# Patient Record
Sex: FEMALE | Race: WHITE | Employment: OTHER | URBAN - METROPOLITAN AREA
[De-identification: names, ages, dates, MRNs, and addresses within clinical notes are randomized per-mention and may not be internally consistent; named-entity substitution may affect disease eponyms.]

---

## 2021-02-26 NOTE — LETTER
AUTHORIZATION FOR SURGICAL OPERATION OR OTHER PROCEDURE    1. I hereby authorize Dr. Cano, and [facility] staff assigned to my case to perform the following operation and/or procedure at the [facility] site:    _______________________________________________________________________________________________    Cortisone injection to Right foot  _______________________________________________________________________________________________    2.  My physician has explained the nature and purpose of the operation or other procedure, possible alternative methods of treatment, the risks involved, and the possibility of complication to me.  I acknowledge that no guarantee has been made as to the result that may be obtained.  3.  I recognize that, during the course of this operation, or other procedure, unforseen conditions may necessitate additional or different procedure than those listed above.  I, therefore, further authorize and request that the above named physician, his/her physician assistants or designees perform such procedures as are, in his/her professional opinion, necessary and desirable.  4.  Any tissue or organs removed in the operation or other procedure may be disposed of by and at the discretion of the [facility] and [facility].  5.  I understand that in the event of a medical emergency, I will be transported by local paramedics to [facility] or other hospital emergency department.  6.  I certify that I have read and fully understand the above consent to operation and/or other procedure.    7.  I acknowledge that my physician has explained sedation/analgesia administration to me including the risks and benefits.  I consent to the administration of sedation/analgesia as may be necessary or desirable in the judgement of my physician.    Witness signature: ___________________________________________________ Date:  ______/______/_____                     Time:  ________ A.M.  P.M.       Patient Name:  ______________________________________________________  (please print)      Patient signature:  ___________________________________________________             Relationship to Patient:           []  Parent    Responsible person                          []  Spouse  In case of minor or                    [] Other  _____________   Incompetent name:  __________________________________________________                               (please print)      _____________      Responsible person  In case of minor or  Incompetent signature:  _______________________________________________    Statement of Physician  My signature below affirms that prior to the time of the procedure, I have explained to the patient and/or his/her guardian, the risks and benefits involved in the proposed treatment and any reasonable alternative to the proposed treatment.  I have also explained the risks and benefits involved in the refusal of the proposed treatment and have answered the patient's questions.                        Date:  ______/______/_______  Provider                      Signature:  __________________________________________________________       Time:  ___________ A.M    P.M.      Nasal mucosa clear.  Mouth with normal mucosa  Throat has no vesicles, no oropharyngeal exudates and uvula is midline.

## 2024-01-11 ENCOUNTER — OFFICE VISIT (OUTPATIENT)
Dept: INTERNAL MEDICINE CLINIC | Facility: CLINIC | Age: 74
End: 2024-01-11
Payer: MEDICARE

## 2024-01-11 ENCOUNTER — LAB ENCOUNTER (OUTPATIENT)
Dept: LAB | Age: 74
End: 2024-01-11
Attending: INTERNAL MEDICINE
Payer: MEDICARE

## 2024-01-11 VITALS
HEART RATE: 85 BPM | RESPIRATION RATE: 16 BRPM | SYSTOLIC BLOOD PRESSURE: 120 MMHG | TEMPERATURE: 97 F | BODY MASS INDEX: 30.21 KG/M2 | DIASTOLIC BLOOD PRESSURE: 76 MMHG | WEIGHT: 174.81 LBS | HEIGHT: 63.6 IN | OXYGEN SATURATION: 98 %

## 2024-01-11 DIAGNOSIS — D22.9 ATYPICAL NEVI: ICD-10-CM

## 2024-01-11 DIAGNOSIS — G89.29 CHRONIC PAIN OF RIGHT KNEE: ICD-10-CM

## 2024-01-11 DIAGNOSIS — R79.89 LOW VITAMIN D LEVEL: ICD-10-CM

## 2024-01-11 DIAGNOSIS — Z00.00 ENCOUNTER FOR ANNUAL WELLNESS VISIT (AWV) IN MEDICARE PATIENT: Primary | ICD-10-CM

## 2024-01-11 DIAGNOSIS — Z78.0 POSTMENOPAUSAL: ICD-10-CM

## 2024-01-11 DIAGNOSIS — Z12.31 ENCOUNTER FOR SCREENING MAMMOGRAM FOR MALIGNANT NEOPLASM OF BREAST: ICD-10-CM

## 2024-01-11 DIAGNOSIS — M25.561 CHRONIC PAIN OF RIGHT KNEE: ICD-10-CM

## 2024-01-11 DIAGNOSIS — R68.89 OTHER GENERAL SYMPTOMS AND SIGNS: ICD-10-CM

## 2024-01-11 DIAGNOSIS — M79.671 RIGHT FOOT PAIN: ICD-10-CM

## 2024-01-11 PROBLEM — E78.5 HYPERLIPIDEMIA: Status: ACTIVE | Noted: 2020-02-25

## 2024-01-11 PROBLEM — M85.80 OSTEOPENIA: Status: ACTIVE | Noted: 2020-02-25

## 2024-01-11 PROBLEM — H40.211 ACUTE ANGLE-CLOSURE GLAUCOMA OF RIGHT EYE: Status: ACTIVE | Noted: 2021-04-14

## 2024-01-11 PROBLEM — Z86.69 HISTORY OF GLAUCOMA: Status: ACTIVE | Noted: 2021-04-14

## 2024-01-11 LAB
ALBUMIN SERPL-MCNC: 4.3 G/DL (ref 3.4–5)
ALBUMIN/GLOB SERPL: 1.2 {RATIO} (ref 1–2)
ALP LIVER SERPL-CCNC: 96 U/L
ALT SERPL-CCNC: 54 U/L
ANION GAP SERPL CALC-SCNC: 4 MMOL/L (ref 0–18)
AST SERPL-CCNC: 27 U/L (ref 15–37)
BASOPHILS # BLD AUTO: 0.05 X10(3) UL (ref 0–0.2)
BASOPHILS NFR BLD AUTO: 0.8 %
BILIRUB SERPL-MCNC: 1.4 MG/DL (ref 0.1–2)
BUN BLD-MCNC: 14 MG/DL (ref 9–23)
CALCIUM BLD-MCNC: 9.6 MG/DL (ref 8.5–10.1)
CHLORIDE SERPL-SCNC: 106 MMOL/L (ref 98–112)
CHOLEST SERPL-MCNC: 311 MG/DL (ref ?–200)
CO2 SERPL-SCNC: 27 MMOL/L (ref 21–32)
CREAT BLD-MCNC: 0.83 MG/DL
EGFRCR SERPLBLD CKD-EPI 2021: 75 ML/MIN/1.73M2 (ref 60–?)
EOSINOPHIL # BLD AUTO: 0.16 X10(3) UL (ref 0–0.7)
EOSINOPHIL NFR BLD AUTO: 2.5 %
ERYTHROCYTE [DISTWIDTH] IN BLOOD BY AUTOMATED COUNT: 12.1 %
EST. AVERAGE GLUCOSE BLD GHB EST-MCNC: 117 MG/DL (ref 68–126)
FASTING PATIENT LIPID ANSWER: YES
FASTING STATUS PATIENT QL REPORTED: YES
GLOBULIN PLAS-MCNC: 3.5 G/DL (ref 2.8–4.4)
GLUCOSE BLD-MCNC: 101 MG/DL (ref 70–99)
HBA1C MFR BLD: 5.7 % (ref ?–5.7)
HCT VFR BLD AUTO: 43.6 %
HDLC SERPL-MCNC: 53 MG/DL (ref 40–59)
HGB BLD-MCNC: 14.3 G/DL
IMM GRANULOCYTES # BLD AUTO: 0.04 X10(3) UL (ref 0–1)
IMM GRANULOCYTES NFR BLD: 0.6 %
LDLC SERPL CALC-MCNC: 217 MG/DL (ref ?–100)
LYMPHOCYTES # BLD AUTO: 2.39 X10(3) UL (ref 1–4)
LYMPHOCYTES NFR BLD AUTO: 37.5 %
MCH RBC QN AUTO: 29.4 PG (ref 26–34)
MCHC RBC AUTO-ENTMCNC: 32.8 G/DL (ref 31–37)
MCV RBC AUTO: 89.5 FL
MONOCYTES # BLD AUTO: 0.51 X10(3) UL (ref 0.1–1)
MONOCYTES NFR BLD AUTO: 8 %
NEUTROPHILS # BLD AUTO: 3.22 X10 (3) UL (ref 1.5–7.7)
NEUTROPHILS # BLD AUTO: 3.22 X10(3) UL (ref 1.5–7.7)
NEUTROPHILS NFR BLD AUTO: 50.6 %
NONHDLC SERPL-MCNC: 258 MG/DL (ref ?–130)
OSMOLALITY SERPL CALC.SUM OF ELEC: 285 MOSM/KG (ref 275–295)
PLATELET # BLD AUTO: 309 10(3)UL (ref 150–450)
POTASSIUM SERPL-SCNC: 3.8 MMOL/L (ref 3.5–5.1)
PROT SERPL-MCNC: 7.8 G/DL (ref 6.4–8.2)
RBC # BLD AUTO: 4.87 X10(6)UL
SODIUM SERPL-SCNC: 137 MMOL/L (ref 136–145)
TRIGL SERPL-MCNC: 211 MG/DL (ref 30–149)
TSI SER-ACNC: 1.79 MIU/ML (ref 0.36–3.74)
VIT D+METAB SERPL-MCNC: 54.4 NG/ML (ref 30–100)
VLDLC SERPL CALC-MCNC: 47 MG/DL (ref 0–30)
WBC # BLD AUTO: 6.4 X10(3) UL (ref 4–11)

## 2024-01-11 PROCEDURE — 85025 COMPLETE CBC W/AUTO DIFF WBC: CPT

## 2024-01-11 PROCEDURE — 84443 ASSAY THYROID STIM HORMONE: CPT

## 2024-01-11 PROCEDURE — 80053 COMPREHEN METABOLIC PANEL: CPT

## 2024-01-11 PROCEDURE — 83036 HEMOGLOBIN GLYCOSYLATED A1C: CPT

## 2024-01-11 PROCEDURE — 82306 VITAMIN D 25 HYDROXY: CPT

## 2024-01-11 PROCEDURE — 36415 COLL VENOUS BLD VENIPUNCTURE: CPT

## 2024-01-11 PROCEDURE — 80061 LIPID PANEL: CPT

## 2024-01-11 PROCEDURE — G0439 PPPS, SUBSEQ VISIT: HCPCS | Performed by: INTERNAL MEDICINE

## 2024-01-11 PROCEDURE — 99204 OFFICE O/P NEW MOD 45 MIN: CPT | Performed by: INTERNAL MEDICINE

## 2024-01-11 RX ORDER — ROSUVASTATIN CALCIUM 20 MG/1
20 TABLET, COATED ORAL DAILY
COMMUNITY
End: 2024-01-15

## 2024-01-11 RX ORDER — CLOBETASOL PROPIONATE 0.5 MG/G
OINTMENT TOPICAL
COMMUNITY

## 2024-01-11 RX ORDER — MULTIVIT-MIN/IRON/FOLIC ACID/K 18-600-40
6000 CAPSULE ORAL DAILY
COMMUNITY

## 2024-01-11 NOTE — H&P
Subjective:   Kavita Cee is a 73 year old female who presents for a Medicare Subsequent Annual Wellness visit (Pt already had Initial Annual Wellness) and scheduled follow up of multiple significant but stable problems and the following:  .     Hx of glaucoma- follows with ophthalmology. Permanent vision change in R eye     HLD- on statin     Denies immediate family hx of breast or colon cancer.  Denies breast or nipple changes.   Planning for cologuard.   Recently saw gyne in 11/2023.   Had breast/gyne exam at that time.     Has R knee and R foot pain intermittently.   Has been present for years but noting more recently.     Has new growth on upper lip that has been present for 2 weeks and not resolving.     Remainder of ROS negative   History/Other:   Fall Risk Assessment:   She has been screened for Falls and is low risk.      Cognitive Assessment:   She had a completely normal cognitive assessment - see flowsheet entries       Functional Ability/Status:   Kavita Cee has some abnormal functions as listed below:  She has Hearing problems based on screening of functional status.      Depression Screening (PHQ-2/PHQ-9): PHQ-2 SCORE: 0, done 1/11/2024   Last [place] Suicide Screening on 1/11/2024 was No Risk.         Advanced Directives:   She does have a Living Will but we do NOT have it on file in Epic.    She does have a POA but we do NOT have it on file in [employer].    Patient has Advance Care Planning documents but we do not have a copy in EMR. Discussed Advanced Care Planning with patient and instructed patient to get our office a copy to be scanned into EMR.      Patient Active Problem List   Diagnosis    Hyperlipidemia    Acute angle-closure glaucoma of right eye    Osteopenia     Allergies:  She has No Known Allergies.    Current Medications:  Outpatient Medications Marked as Taking for the 1/11/24 encounter (Office Visit) with Deisy Olivas MD   Medication Sig    Calcium Carbonate-Vit  D-Min (CALCIUM 1200 OR) Take by mouth As Directed.    Multiple Vitamins-Minerals (CENTRUM FRESH/FRUITY 50+ OR) Take by mouth As Directed.    clobetasol 0.05 % External Ointment APPLY TO AFFECTED AREA TWICE DAILY FOR 7 DAYS THEN APPLY ONCE AT BEDTIME FOR 7 DAYS    rosuvastatin 20 MG Oral Tab Take 1 tablet (20 mg total) by mouth daily.    Cholecalciferol (VITAMIN D) 50 MCG (2000 UT) Oral Cap Take 3 capsules (6,000 Units total) by mouth daily.       Medical History:  She  has a past medical history of Anxiety and High cholesterol.  Surgical History:  She  has a past surgical history that includes hx parotidectomy (Right).   Family History:  Her family history includes Alzheimer's disease in her mother; Cancer in her father.  Social History:  She  reports that she has never smoked. She has never used smokeless tobacco. She reports current alcohol use of about 2.0 standard drinks of alcohol per week. She reports that she does not use drugs.    Tobacco:  She has never smoked tobacco.    CAGE Alcohol Screen:   CAGE screening score of 0 on 1/11/2024, showing low risk of alcohol abuse.      Patient Care Team:  Deisy Olivas MD as PCP - General (Internal Medicine)    Review of Systems  As in HPI    Objective:   Physical Exam  PHYSICAL EXAM:   General: no acute distress   Eyes: R pupil dilated (chronic since acute glaucoma episode; follows with ophthalmology); sclera normal; conjunctiva normal   ENT:without erythema or exudate  Neck: trachea midline; no adenopathy; thyroid not enlarged   Hematologic/lymphatic:no cervical lymphadenopathy; no supraclavicular adenopathy   Respiratory: no increased work of breathing; good air exchange; CTAB; no crackles or wheezing   Cardiovascular: RRR; S1, S2; no murmurs; no carotid bruits; no edema   Gastrointestinal: normal bowel sounds; soft; non-distended; non-tender  Neurological: awake, alert, oriented x3; CNII-XII grossly intact  MSK: full ROM; strength 5/5  Behavioral/Psych:  euthymic; appropriate affect   Skin:atypical nevus on upper lip       /76 (BP Location: Left arm, Patient Position: Sitting, Cuff Size: adult)   Pulse 85   Temp 97.3 °F (36.3 °C) (Temporal)   Resp 16   Ht 5' 3.6\" (1.615 m)   Wt 174 lb 12.8 oz (79.3 kg)   SpO2 98%   BMI 30.38 kg/m²  Estimated body mass index is 30.38 kg/m² as calculated from the following:    Height as of this encounter: 5' 3.6\" (1.615 m).    Weight as of this encounter: 174 lb 12.8 oz (79.3 kg).    Medicare Hearing Assessment:   Hearing Screening    Time taken: 1/11/2024 10:44 AM  Entry User: Ken Banda MA  Screening Method: Finger Rub  Finger Rub Result: Pass               Visual Acuity:   Right Eye Visual Acuity: Uncorrected Right Eye Chart Acuity: 20/70   Left Eye Visual Acuity: Uncorrected Left Eye Chart Acuity: 20/70   Both Eyes Visual Acuity: Uncorrected Both Eyes Chart Acuity: 20/70   Able To Tolerate Visual Acuity: Yes        Assessment & Plan:   Kavita Cee is a 73 year old female who presents for a Medicare Assessment.       Kavita was seen today for wellness visit.    Diagnoses and all orders for this visit:    Encounter for annual wellness visit (AWV) in Medicare patient  -     [facility] RHETT 2D+3D SCREENING BILAT (CPT=77067/82146); Future  -     XR DEXA BONE DENSITOMETRY (CPT=77080); Future    Postmenopausal  -     XR DEXA BONE DENSITOMETRY (CPT=77080); Future  -     Vitamin D; Future    Encounter for screening mammogram for malignant neoplasm of breast  -     [facility] RHETT 2D+3D SCREENING BILAT (CPT=77067/59123); Future  -     XR DEXA BONE DENSITOMETRY (CPT=77080); Future  -     Vitamin D; Future  -     CBC With Differential With Platelet; Future  -     Comp Metabolic Panel (14); Future  -     Hemoglobin A1C; Future  -     TSH W Reflex To Free T4; Future  -     Lipid Panel; Future    Low vitamin D level  -     Vitamin D; Future  -     CBC With Differential With Platelet; Future  -     Comp Metabolic Panel (14);  Future  -     Hemoglobin A1C; Future  -     TSH W Reflex To Free T4; Future  -     Lipid Panel; Future    Body mass index (BMI) 30.0-30.9, adult  -     Vitamin D; Future    Other general symptoms and signs  -     TSH W Reflex To Free T4; Future    Atypical nevi  -     Derm Referral - In Network    Chronic pain of right knee  -     Physical Therapy Referral - [facility] Location  -     XR KNEE (1 OR 2 VIEWS), RIGHT (CPT=73560); Future    Right foot pain  -     Physical Therapy Referral - [facility] Location  -     XR FOOT, COMPLETE (MIN 3 VIEWS), RIGHT (CPT=73630); Future          The patient indicates understanding of these issues and agrees to the plan.        No follow-ups on file.     Deisy Perea MD, 1/11/2024     Supplementary Documentation:   General Health:  In the past six months, have you lost more than 10 pounds without trying?: 2 - No  Has your appetite been poor?: No  Type of Diet: Balanced  How does the patient maintain a good energy level?: Appropriate Exercise;Stretching  How would you describe your daily physical activity?: Moderate  How would you describe your current health state?: Good  How do you maintain positive mental well-being?: Social Interaction;Puzzles;Games;Visiting Family  On a scale of 0 to 10, with 0 being no pain and 10 being severe pain, what is your pain level?: 2 - (Mild)  In the past six months, have you experienced urine leakage?: 0-No  At any time do you feel concerned for the safety/well-being of yourself and/or your children, in your home or elsewhere?: No  Have you had any immunizations at another office such as Influenza, Hepatitis B, Tetanus, or Pneumococcal?: Yes       Kavita Cee's SCREENING SCHEDULE   Tests on this list are recommended by your physician but may not be covered, or covered at this frequency, by your insurer.   Please check with your insurance carrier before scheduling to verify coverage.   PREVENTATIVE SERVICES FREQUENCY &  COVERAGE DETAILS LAST  COMPLETION DATE   Diabetes Screening    Fasting Blood Sugar /  Glucose    One screening every 12 months if never tested or if previously tested but not diagnosed with pre-diabetes   One screening every 6 months if diagnosed with pre-diabetes No results found for: \"GLUCOSE\", \"GLU\"     Cardiovascular Disease Screening    Lipid Panel  Cholesterol  Lipoprotein (HDL)  Triglycerides Covered every 5 years for all Medicare beneficiaries without apparent signs or symptoms of cardiovascular disease No results found for: \"CHOLEST\", \"HDL\", \"LDL\", \"LDLD\", \"LDLC\", \"TRIG\"      Electrocardiogram (EKG)   Covered if needed at Welcome to Medicare, and non-screening if indicated for medical reasons -      Ultrasound Screening for Abdominal Aortic Aneurysm (AAA) Covered once in a lifetime for one of the following risk factors    Men who are 65-75 years old and have ever smoked    Anyone with a family history -     Colorectal Cancer Screening  Covered for ages 50-85; only need ONE of the following:    Colonoscopy   Covered every 10 years    Covered every 2 years if patient is at high risk or previous colonoscopy was abnormal -    Health Maintenance   Topic Date Due    Colorectal Cancer Screening  Never done       Flexible Sigmoidoscopy   Covered every 4 years -    Fecal Occult Blood Test Covered annually -   Bone Density Screening    Bone density screening    Covered every 2 years after age 65 if diagnosed with risk of osteoporosis or estrogen deficiency.    Covered yearly for long-term glucocorticoid medication use (Steroids) No results found for this or any previous visit.      Health Maintenance Due   Topic Date Due    DEXA Scan  Never done      Pap and Pelvic    Pap   Covered every 2 years for women at normal risk; Annually if at high risk -  No recommendations at this time    Chlamydia Annually if high risk -  No recommendations at this time   Screening Mammogram    Mammogram     Recommend annually for all female patients aged 40  and older    One baseline mammogram covered for patients aged 35-39 -    Health Maintenance   Topic Date Due    Mammogram  Never done       Immunizations    Influenza Covered once per flu season  Please get every year -  No recommendations at this time    Pneumococcal Each vaccine ([id number] & [id number]) covered once after 65 Prevnar 13: -    [id number]: 09/14/2015     No recommendations at this time    Hepatitis B One screening covered for patients with certain risk factors   -  No recommendations at this time    Tetanus Toxoid Not covered by Medicare Part B unless medically necessary (cut with metal); may be covered with your pharmacy prescription benefits -    Tetanus, Diptheria and Pertusis TD and TDaP Not covered by Medicare Part B -  No recommendations at this time    Zoster Not covered by Medicare Part B; may be covered with your pharmacy  prescription benefits -  No recommendations at this time

## 2024-01-15 DIAGNOSIS — E78.5 HYPERLIPIDEMIA, UNSPECIFIED HYPERLIPIDEMIA TYPE: Primary | ICD-10-CM

## 2024-02-07 ENCOUNTER — HOSPITAL ENCOUNTER (OUTPATIENT)
Dept: GENERAL RADIOLOGY | Age: 74
Discharge: HOME | End: 2024-02-07
Attending: INTERNAL MEDICINE
Payer: MEDICARE

## 2024-02-07 ENCOUNTER — HOSPITAL ENCOUNTER (OUTPATIENT)
Dept: MAMMOGRAPHY | Age: 74
Discharge: HOME | End: 2024-02-07
Attending: INTERNAL MEDICINE
Payer: MEDICARE

## 2024-02-07 ENCOUNTER — HOSPITAL ENCOUNTER (OUTPATIENT)
Dept: BONE DENSITY | Age: 74
Discharge: HOME | End: 2024-02-07
Attending: INTERNAL MEDICINE
Payer: MEDICARE

## 2024-02-07 DIAGNOSIS — M25.561 CHRONIC PAIN OF RIGHT KNEE: ICD-10-CM

## 2024-02-07 DIAGNOSIS — Z00.00 ENCOUNTER FOR ANNUAL WELLNESS VISIT (AWV) IN MEDICARE PATIENT: ICD-10-CM

## 2024-02-07 DIAGNOSIS — M79.671 RIGHT FOOT PAIN: ICD-10-CM

## 2024-02-07 DIAGNOSIS — G89.29 CHRONIC PAIN OF RIGHT KNEE: ICD-10-CM

## 2024-02-07 DIAGNOSIS — Z78.0 POSTMENOPAUSAL: ICD-10-CM

## 2024-02-07 DIAGNOSIS — Z12.31 ENCOUNTER FOR SCREENING MAMMOGRAM FOR MALIGNANT NEOPLASM OF BREAST: ICD-10-CM

## 2024-02-07 PROCEDURE — 73630 X-RAY EXAM OF FOOT: CPT | Performed by: INTERNAL MEDICINE

## 2024-02-07 PROCEDURE — 77067 SCR MAMMO BI INCL CAD: CPT | Performed by: INTERNAL MEDICINE

## 2024-02-07 PROCEDURE — 73560 X-RAY EXAM OF KNEE 1 OR 2: CPT | Performed by: INTERNAL MEDICINE

## 2024-02-07 PROCEDURE — 77063 BREAST TOMOSYNTHESIS BI: CPT | Performed by: INTERNAL MEDICINE

## 2024-02-07 PROCEDURE — 77080 DXA BONE DENSITY AXIAL: CPT | Performed by: INTERNAL MEDICINE

## 2024-02-12 ENCOUNTER — PATIENT MESSAGE (OUTPATIENT)
Dept: INTERNAL MEDICINE CLINIC | Facility: CLINIC | Age: 74
End: 2024-02-12

## 2024-02-12 NOTE — TELEPHONE ENCOUNTER
From: Kavita Cee  To: Deisy Perea  Sent: 2/12/2024 10:50 AM CST  Subject: Test results    Hi Dr Lake  I was wondering if you had a chance to review my test results I had done on the 7th.   After looking at the results on My Chart I have several questions .   Thank you Kavita

## 2024-02-12 NOTE — TELEPHONE ENCOUNTER
Deisy Perea MD  2/7/2024  6:24 PM CST       Results reviewed. Please inform patient that foot xray showing moderate DJD; to try PT; if no improvement then to see podiatry. Knee xray with mild DJD.  DEXA shows that patient has osteopenia. Goal is to consume about a total of 1000-1200mg of calcium daily either through dietary sources or supplements. Also recommend doing light weight bearing exercise as tolerated.  Repeat DEXA in 2 years.  Mammogram results pending outside comparison.

## 2024-03-26 ENCOUNTER — MED REC SCAN ONLY (OUTPATIENT)
Dept: INTERNAL MEDICINE CLINIC | Facility: CLINIC | Age: 74
End: 2024-03-26

## 2024-03-29 ENCOUNTER — LAB ENCOUNTER (OUTPATIENT)
Dept: LAB | Age: 74
End: 2024-03-29
Attending: INTERNAL MEDICINE
Payer: MEDICARE

## 2024-03-29 DIAGNOSIS — E78.5 HYPERLIPIDEMIA, UNSPECIFIED HYPERLIPIDEMIA TYPE: ICD-10-CM

## 2024-03-29 LAB
ALBUMIN SERPL-MCNC: 4.8 G/DL (ref 3.2–4.8)
ALBUMIN/GLOB SERPL: 2 {RATIO} (ref 1–2)
ALP LIVER SERPL-CCNC: 69 U/L
ALT SERPL-CCNC: 27 U/L
ANION GAP SERPL CALC-SCNC: 4 MMOL/L (ref 0–18)
AST SERPL-CCNC: 24 U/L (ref ?–34)
BILIRUB SERPL-MCNC: 1.9 MG/DL (ref 0.2–1.1)
BUN BLD-MCNC: 17 MG/DL (ref 9–23)
BUN/CREAT SERPL: 18.5 (ref 10–20)
CALCIUM BLD-MCNC: 10 MG/DL (ref 8.7–10.4)
CHLORIDE SERPL-SCNC: 107 MMOL/L (ref 98–112)
CHOLEST SERPL-MCNC: 187 MG/DL (ref ?–200)
CO2 SERPL-SCNC: 28 MMOL/L (ref 21–32)
CREAT BLD-MCNC: 0.92 MG/DL
EGFRCR SERPLBLD CKD-EPI 2021: 66 ML/MIN/1.73M2 (ref 60–?)
FASTING PATIENT LIPID ANSWER: YES
FASTING STATUS PATIENT QL REPORTED: YES
GLOBULIN PLAS-MCNC: 2.4 G/DL (ref 2.8–4.4)
GLUCOSE BLD-MCNC: 103 MG/DL (ref 70–99)
HDLC SERPL-MCNC: 48 MG/DL (ref 40–59)
LDLC SERPL CALC-MCNC: 109 MG/DL (ref ?–100)
NONHDLC SERPL-MCNC: 139 MG/DL (ref ?–130)
OSMOLALITY SERPL CALC.SUM OF ELEC: 290 MOSM/KG (ref 275–295)
POTASSIUM SERPL-SCNC: 4.4 MMOL/L (ref 3.5–5.1)
PROT SERPL-MCNC: 7.2 G/DL (ref 5.7–8.2)
SODIUM SERPL-SCNC: 139 MMOL/L (ref 136–145)
TRIGL SERPL-MCNC: 169 MG/DL (ref 30–149)
VLDLC SERPL CALC-MCNC: 29 MG/DL (ref 0–30)

## 2024-03-29 PROCEDURE — 80061 LIPID PANEL: CPT

## 2024-03-29 PROCEDURE — 80053 COMPREHEN METABOLIC PANEL: CPT

## 2024-03-29 PROCEDURE — 36415 COLL VENOUS BLD VENIPUNCTURE: CPT

## 2024-04-26 ENCOUNTER — LAB ENCOUNTER (OUTPATIENT)
Dept: LAB | Age: 74
End: 2024-04-26
Attending: INTERNAL MEDICINE
Payer: MEDICARE

## 2024-04-26 DIAGNOSIS — R17 ELEVATED BILIRUBIN: ICD-10-CM

## 2024-04-26 LAB
ALBUMIN SERPL-MCNC: 4.8 G/DL (ref 3.2–4.8)
ALBUMIN/GLOB SERPL: 2.2 {RATIO} (ref 1–2)
ALP LIVER SERPL-CCNC: 73 U/L
ALT SERPL-CCNC: 39 U/L
ANION GAP SERPL CALC-SCNC: 7 MMOL/L (ref 0–18)
AST SERPL-CCNC: 40 U/L (ref ?–34)
BILIRUB DIRECT SERPL-MCNC: 0.6 MG/DL (ref ?–0.3)
BILIRUB SERPL-MCNC: 1.9 MG/DL (ref 0.2–1.1)
BUN BLD-MCNC: 15 MG/DL (ref 9–23)
BUN/CREAT SERPL: 17.2 (ref 10–20)
CALCIUM BLD-MCNC: 9.6 MG/DL (ref 8.7–10.4)
CHLORIDE SERPL-SCNC: 106 MMOL/L (ref 98–112)
CO2 SERPL-SCNC: 28 MMOL/L (ref 21–32)
CREAT BLD-MCNC: 0.87 MG/DL
EGFRCR SERPLBLD CKD-EPI 2021: 71 ML/MIN/1.73M2 (ref 60–?)
FASTING STATUS PATIENT QL REPORTED: YES
GLOBULIN PLAS-MCNC: 2.2 G/DL (ref 2.8–4.4)
GLUCOSE BLD-MCNC: 108 MG/DL (ref 70–99)
OSMOLALITY SERPL CALC.SUM OF ELEC: 293 MOSM/KG (ref 275–295)
POTASSIUM SERPL-SCNC: 3.9 MMOL/L (ref 3.5–5.1)
PROT SERPL-MCNC: 7 G/DL (ref 5.7–8.2)
SODIUM SERPL-SCNC: 141 MMOL/L (ref 136–145)

## 2024-04-26 PROCEDURE — 82248 BILIRUBIN DIRECT: CPT

## 2024-04-26 PROCEDURE — 80053 COMPREHEN METABOLIC PANEL: CPT

## 2024-04-26 PROCEDURE — 36415 COLL VENOUS BLD VENIPUNCTURE: CPT

## 2024-04-27 DIAGNOSIS — R79.89 ELEVATED LFTS: Primary | ICD-10-CM

## 2024-04-28 ENCOUNTER — PATIENT MESSAGE (OUTPATIENT)
Dept: INTERNAL MEDICINE CLINIC | Facility: CLINIC | Age: 74
End: 2024-04-28

## 2024-04-29 NOTE — TELEPHONE ENCOUNTER
Patient called back after reviewing MCM asking what could be causing the elevation in numbers. It was explained that it's hard to say without seeing what's going on with the liver. Explained it could be alcohol use, weight, vs other things. Patient v/u.

## 2024-04-29 NOTE — TELEPHONE ENCOUNTER
From: Kavita Cee  To: Deisy Perea  Sent: 4/28/2024 9:07 AM CDT  Subject: Test results    Good morning,   I see that the bilirubin is still elevated and was wondering what could be causing this?   Thanks Kavita

## 2024-04-29 NOTE — TELEPHONE ENCOUNTER
LMTCB x 1         Deisy Perea MD  4/27/2024 11:00 AM CDT Back to Top      Results reviewed. Please inform patient that she still has elevated bilirubin and now slightly elevated AST. Should complete abdominal US. And repeat cmp in 2 weeks. Orders placed. If acute abdominal pain then ER.

## 2024-05-03 ENCOUNTER — OFFICE VISIT (OUTPATIENT)
Dept: PODIATRY CLINIC | Facility: CLINIC | Age: 74
End: 2024-05-03
Payer: MEDICARE

## 2024-05-03 VITALS — DIASTOLIC BLOOD PRESSURE: 78 MMHG | SYSTOLIC BLOOD PRESSURE: 128 MMHG

## 2024-05-03 DIAGNOSIS — M19.071 ARTHRITIS OF RIGHT MIDFOOT: Primary | ICD-10-CM

## 2024-05-03 DIAGNOSIS — M79.671 RIGHT FOOT PAIN: ICD-10-CM

## 2024-05-03 PROCEDURE — 20600 DRAIN/INJ JOINT/BURSA W/O US: CPT | Performed by: STUDENT IN AN ORGANIZED HEALTH CARE EDUCATION/TRAINING PROGRAM

## 2024-05-03 PROCEDURE — 99203 OFFICE O/P NEW LOW 30 MIN: CPT | Performed by: STUDENT IN AN ORGANIZED HEALTH CARE EDUCATION/TRAINING PROGRAM

## 2024-05-03 RX ORDER — DEXAMETHASONE SODIUM PHOSPHATE 4 MG/ML
2 VIAL (ML) INJECTION ONCE
Status: COMPLETED | OUTPATIENT
Start: 2024-05-03 | End: 2024-05-03

## 2024-05-03 NOTE — PROGRESS NOTES
Per Dr. Cano to draw up .5ml of dexamethasone sodium phosphate. .5ml Kenalog. 10 and 1ml marcaine 0.5% for a right foot injection.

## 2024-05-05 NOTE — PROGRESS NOTES
[facility]  Progress Note    Kavita Cee is a 73 year old female.   Chief Complaint   Patient presents with    Foot Pain     Right foot- rates pain 10/10 when walking-          HPI:     Patient is a very pleasant 73-year-old female who presents to clinic for evaluation of pain to her right midfoot region.  She has pain that can reach a 10 out of 10 when walking.  She has completed x-rays in the past which shows some arthritic changes to her midfoot.  She has completed physical therapy which temporarily helped her symptoms but they returned afterwards.  Patient is very active.  She denies any recent trauma or injury.  She presents today for evaluation.  No other complaints are mentioned.  Past medical history, medications, and allergies reviewed.      Allergies: Patient has no known allergies.   Current Outpatient Medications   Medication Sig Dispense Refill    rosuvastatin 20 MG Oral Tab Take 1 tablet (20 mg total) by mouth daily. 90 tablet 3    Calcium Carbonate-Vit D-Min (CALCIUM 1200 OR) Take by mouth As Directed.      Multiple Vitamins-Minerals (CENTRUM FRESH/FRUITY 50+ OR) Take by mouth As Directed.      clobetasol 0.05 % External Ointment APPLY TO AFFECTED AREA TWICE DAILY FOR 7 DAYS THEN APPLY ONCE AT BEDTIME FOR 7 DAYS      Cholecalciferol (VITAMIN D) 50 MCG (2000 UT) Oral Cap Take 3 capsules (6,000 Units total) by mouth daily.        Past Medical History:    Anxiety    High cholesterol      Past Surgical History:   Procedure Laterality Date    Hx parotidectomy Right       Family History   Problem Relation Age of Onset    Cancer Father     Other (Alzheimer's disease) Mother       Social History     Socioeconomic History    Marital status:    Tobacco Use    Smoking status: Never    Smokeless tobacco: Never   Vaping Use    Vaping status: Never Used   Substance and Sexual Activity    Alcohol use: Yes     Alcohol/week: 2.0 standard drinks of alcohol     Types: 2 Standard drinks or  equivalent per week     Comment: Occ    Drug use: No   Other Topics Concern    Caffeine Concern No    Exercise No    Seat Belt No    Special Diet No    Stress Concern No    Weight Concern No           REVIEW OF SYSTEMS:     Denies nausea, vomiting, fever, chills.      EXAM:   /78 (BP Location: Right arm, Patient Position: Sitting, Cuff Size: adult)   GENERAL: well developed, well nourished, in no apparent distress  EXTREMITIES:   1. Integument: Normal skin temperature and turgor  2. Vascular: Dorsalis pedis two out of four bilateral and posterior tibial pulses two out of   four bilateral, capillary refill normal.   3. Musculoskeletal: All muscle groups are graded 5 out of 5 in the foot and ankle.  Exostosis formation noted to the dorsal right midfoot.  Pain noted exostosis site.  Compartments are soft and compressible.   4. Neurological: Normal sharp dull sensation; reflexes normal.          ASSESSMENT AND PLAN:   Diagnoses and all orders for this visit:    Arthritis of right midfoot  -     dexamethasone (Decadron) 4 MG/ML injection 2 mg  -     triamcinolone acetonide (Kenalog-10) 10 mg/mL injection    Other orders  -     [facility] AMB POD XR - RT FOOT 3 VIEWS(AP,LAT,OBLIQUE)WT BEARING        Plan:    -Patient examined, chart history reviewed.  -Discussed etiology of condition and various treatment options.  -Obtain reviewed x-ray findings with patient--fairly advanced arthritic changes to midtarsal joints, especially first through third tarsometatarsal joints with exostosis formation.  This does correlate with area of patient's pain.  -Discussed importance of supportive shoes and inserts.  Informational handout dispensed with recommended shoes.  Recommend Hoka tennis shoes.  -Patient to ambulate supportive shoes and avoid walking barefoot.  -Discussed cortisone injection to patient including benefits and risks.  Patient agreeable and written consent was obtained.  After prepping site with alcohol, administered  cortisone injection to right midfoot tarsometatarsal joints consisting of 1 cc 0.5% Marcaine plain, 0.5 cc of Kenalog 10, 0.5 cc of dexamethasone.  Patient tolerated injection well and there were no complications.  Site was dressed with Band-Aid.  -If symptoms worsen or fail to improve could also consider midfoot fusion procedures.  Patient like to avoid if possible, understandably.    Can follow-up in 1 month for reevaluation or sooner if other concerns arise.    The patient indicates understanding of these issues and agrees to the plan.        [employer] speech recognition software was used to prepare this note.  Errors in word recognition may occur.  Please contact me with any questions/concerns with this note.

## 2024-05-10 ENCOUNTER — PATIENT MESSAGE (OUTPATIENT)
Dept: PODIATRY CLINIC | Facility: CLINIC | Age: 74
End: 2024-05-10

## 2024-05-10 NOTE — TELEPHONE ENCOUNTER
From: Kavita Cee  To: Kash Cano  Sent: 5/10/2024 7:58 AM CDT  Subject: Foot issue    Good morning Dr Cano,  Thank you for helping me with my foot pain, my foot feels so much better!   Sorry to bother you with what seems like a silly question but I went to the [employer] store for a pair of Hoka’s as you suggested and walked out of the store so happy without any foot issues. After wearing the shoes for a few hours the pain on top of my foot was gone but I was having other issues with the shoes like my toes felt cramped in them. I’m just not sure if it’s the fit of the shoe or the wrong shoe for my foot. Before I go back with them, would you suggest staying with this brand of shoes just in a different size or another brand of shoe that was on your list?   Thank you   Kavita

## 2024-05-30 ENCOUNTER — TELEPHONE (OUTPATIENT)
Dept: INTERNAL MEDICINE CLINIC | Facility: CLINIC | Age: 74
End: 2024-05-30

## 2024-05-30 NOTE — TELEPHONE ENCOUNTER
Incoming Fax  [facility] placed in DV's inbasket for review and signature.

## 2024-06-11 ENCOUNTER — OFFICE VISIT (OUTPATIENT)
Dept: PODIATRY CLINIC | Facility: CLINIC | Age: 74
End: 2024-06-11

## 2024-06-11 DIAGNOSIS — M79.671 RIGHT FOOT PAIN: ICD-10-CM

## 2024-06-11 DIAGNOSIS — M19.071 ARTHRITIS OF RIGHT MIDFOOT: Primary | ICD-10-CM

## 2024-06-11 PROCEDURE — 99213 OFFICE O/P EST LOW 20 MIN: CPT | Performed by: STUDENT IN AN ORGANIZED HEALTH CARE EDUCATION/TRAINING PROGRAM

## 2024-06-11 NOTE — PROGRESS NOTES
[facility] Podiatry  Progress Note    Kavita Cee is a 73 year old female.   Chief Complaint   Patient presents with    Foot Pain     R foot f/u- had injection on 5/03/2024 w/ some improvement- rates pain 2-3/10 depends w/ activity         HPI:     Patient is a very pleasant 73-year-old female who presents to clinic for evaluation of pain to her right midfoot region.  She received cortisone injection at site of last visit which did not really help her symptoms.  She has no pain at rest but does have some pain with activities that can reach a 2 or 3 out of 10.  This is better than the 10 on 10 pain she experienced last visit.  She has also acquired new Peters tennis shoes relates that these are very comfortable and working well for her.  She presents today for reevaluation.  No other complaints are mentioned.    Allergies: Patient has no known allergies.   Current Outpatient Medications   Medication Sig Dispense Refill    rosuvastatin 20 MG Oral Tab Take 1 tablet (20 mg total) by mouth daily. 90 tablet 3    Calcium Carbonate-Vit D-Min (CALCIUM 1200 OR) Take by mouth As Directed.      Multiple Vitamins-Minerals (CENTRUM FRESH/FRUITY 50+ OR) Take by mouth As Directed.      clobetasol 0.05 % External Ointment APPLY TO AFFECTED AREA TWICE DAILY FOR 7 DAYS THEN APPLY ONCE AT BEDTIME FOR 7 DAYS      Cholecalciferol (VITAMIN D) 50 MCG (2000 UT) Oral Cap Take 3 capsules (6,000 Units total) by mouth daily.        Past Medical History:    Anxiety    High cholesterol      Past Surgical History:   Procedure Laterality Date    Hx parotidectomy Right       Family History   Problem Relation Age of Onset    Cancer Father     Other (Alzheimer's disease) Mother       Social History     Socioeconomic History    Marital status:    Tobacco Use    Smoking status: Never    Smokeless tobacco: Never   Vaping Use    Vaping status: Never Used   Substance and Sexual Activity    Alcohol use: Yes     Alcohol/week: 2.0 standard  drinks of alcohol     Types: 2 Standard drinks or equivalent per week     Comment: Occ    Drug use: No   Other Topics Concern    Caffeine Concern No    Exercise No    Seat Belt No    Special Diet No    Stress Concern No    Weight Concern No           REVIEW OF SYSTEMS:     Denies nausea, vomiting, fever, chills.      EXAM:   There were no vitals taken for this visit.  GENERAL: well developed, well nourished, in no apparent distress  EXTREMITIES:   1. Integument: Normal skin temperature and turgor  2. Vascular: Dorsalis pedis two out of four bilateral and posterior tibial pulses two out of   four bilateral, capillary refill normal.   3. Musculoskeletal: All muscle groups are graded 5 out of 5 in the foot and ankle.  Exostosis formation noted to the dorsal right midfoot.  Today there is no major pain noted exostosis site.  Compartments are soft and compressible.   4. Neurological: Normal sharp dull sensation; reflexes normal.          ASSESSMENT AND PLAN:   Diagnoses and all orders for this visit:    Arthritis of right midfoot    Right foot pain          Plan:    -Patient examined, chart history reviewed.  -Discussed etiology of condition and various treatment options.  -Reviewed prior x-ray findings--fairly advanced arthritic changes to midtarsal joints, especially first through third tarsometatarsal joints with exostosis formation.  This does correlate with area of patient's pain.  -Patient's pain is much better after last cortisone shot.  Discussed with patient that she can receive up to 3 days/year.  -Patient should continue better with supportive Peters tennis shoes.  Should avoid walking barefoot, even in her house.  -If symptoms worsen or fail to improve could also consider midfoot fusion procedures.  Patient like to avoid if possible, understandably.    Can RTC 2 months for possible cortisone shot/as needed.    The patient indicates understanding of these issues and agrees to the plan.        Kash Cano,  DPM    Dragon speech recognition software was used to prepare this note.  Errors in word recognition may occur.  Please contact me with any questions/concerns with this note.

## 2024-07-10 ENCOUNTER — HOSPITAL ENCOUNTER (OUTPATIENT)
Dept: ULTRASOUND IMAGING | Age: 74
Discharge: HOME | End: 2024-07-10
Attending: INTERNAL MEDICINE
Payer: MEDICARE

## 2024-07-10 DIAGNOSIS — R79.89 ELEVATED LFTS: ICD-10-CM

## 2024-07-10 PROCEDURE — 76700 US EXAM ABDOM COMPLETE: CPT | Performed by: INTERNAL MEDICINE

## 2024-09-04 ENCOUNTER — HOSPITAL ENCOUNTER (OUTPATIENT)
Dept: MRI IMAGING | Facility: HOSPITAL | Age: 74
Discharge: HOME | End: 2024-09-04
Attending: INTERNAL MEDICINE
Payer: MEDICARE

## 2024-09-04 DIAGNOSIS — R16.0 LIVER MASS: ICD-10-CM

## 2024-09-04 DIAGNOSIS — K86.2 PANCREAS CYST (HCC): ICD-10-CM

## 2024-09-04 PROCEDURE — 74183 MRI ABD W/O CNTR FLWD CNTR: CPT | Performed by: INTERNAL MEDICINE

## 2024-09-04 PROCEDURE — A9575 INJ GADOTERATE MEGLUMI 0.1ML: HCPCS | Performed by: INTERNAL MEDICINE

## 2024-09-04 RX ORDER — GADOTERATE MEGLUMINE 376.9 MG/ML
20 INJECTION INTRAVENOUS
Status: COMPLETED | OUTPATIENT
Start: 2024-09-04 | End: 2024-09-04

## 2024-09-04 RX ADMIN — GADOTERATE MEGLUMINE 16 ML: 376.9 INJECTION INTRAVENOUS at 07:54:00

## 2024-09-11 ENCOUNTER — OFFICE VISIT (OUTPATIENT)
Dept: PODIATRY CLINIC | Facility: CLINIC | Age: 74
End: 2024-09-11

## 2024-09-11 VITALS — DIASTOLIC BLOOD PRESSURE: 90 MMHG | SYSTOLIC BLOOD PRESSURE: 160 MMHG

## 2024-09-11 DIAGNOSIS — M19.071 ARTHRITIS OF RIGHT MIDFOOT: Primary | ICD-10-CM

## 2024-09-11 DIAGNOSIS — M79.671 RIGHT FOOT PAIN: ICD-10-CM

## 2024-09-11 PROCEDURE — 99213 OFFICE O/P EST LOW 20 MIN: CPT | Performed by: STUDENT IN AN ORGANIZED HEALTH CARE EDUCATION/TRAINING PROGRAM

## 2024-09-11 RX ORDER — DEXAMETHASONE SODIUM PHOSPHATE 4 MG/ML
2 VIAL (ML) INJECTION ONCE
Status: COMPLETED | OUTPATIENT
Start: 2024-09-11 | End: 2024-09-11

## 2024-09-11 RX ADMIN — DEXAMETHASONE SODIUM PHOSPHATE 2 MG: 4 MG/ML VIAL (ML) INJECTION at 10:02:00

## 2024-09-11 NOTE — PROGRESS NOTES
Per Dr. Cano to draw up .5ml of dexamethasone sodium phosphate. .5ml Kenalog 10 and 1ml marcaine 0.5% for a right foot injection .

## 2024-09-11 NOTE — PROGRESS NOTES
[facility] Podiatry  Progress Note    Kavita Cee is a 73 year old female.   Chief Complaint   Patient presents with    Foot Pain     R foot f/u- pt requesting cortisone injection today- rates pain 3-4/10 on and off         HPI:     Patient is a very pleasant 73-year-old female who presents to clinic for evaluation of pain to her right midfoot region.  She is doing better with cortisone injections and supportive Peters tennis shoes.  She has pain that she rates at a 3-4 out of 10 on and off and is hoping for another cortisone injection at this time.  It has been 4 months since her last injection.  No other complaints are mentioned.      Allergies: Patient has no known allergies.   Current Outpatient Medications   Medication Sig Dispense Refill    rosuvastatin 20 MG Oral Tab Take 1 tablet (20 mg total) by mouth daily. 90 tablet 3    Calcium Carbonate-Vit D-Min (CALCIUM 1200 OR) Take by mouth As Directed.      Multiple Vitamins-Minerals (CENTRUM FRESH/FRUITY 50+ OR) Take by mouth As Directed.      clobetasol 0.05 % External Ointment APPLY TO AFFECTED AREA TWICE DAILY FOR 7 DAYS THEN APPLY ONCE AT BEDTIME FOR 7 DAYS      Cholecalciferol (VITAMIN D) 50 MCG (2000 UT) Oral Cap Take 3 capsules (6,000 Units total) by mouth daily.        Past Medical History:    Anxiety    High cholesterol      Past Surgical History:   Procedure Laterality Date    Hx parotidectomy Right       Family History   Problem Relation Age of Onset    Cancer Father     Other (Alzheimer's disease) Mother       Social History     Socioeconomic History    Marital status:    Tobacco Use    Smoking status: Never    Smokeless tobacco: Never   Vaping Use    Vaping status: Never Used   Substance and Sexual Activity    Alcohol use: Yes     Alcohol/week: 2.0 standard drinks of alcohol     Types: 2 Standard drinks or equivalent per week     Comment: Occ    Drug use: No   Other Topics Concern    Caffeine Concern No    Exercise No    Seat Belt  No    Special Diet No    Stress Concern No    Weight Concern No           REVIEW OF SYSTEMS:     Denies nausea, vomiting, fever, chills.      EXAM:   There were no vitals taken for this visit.  GENERAL: well developed, well nourished, in no apparent distress  EXTREMITIES:   1. Integument: Normal skin temperature and turgor  2. Vascular: Dorsalis pedis two out of four bilateral and posterior tibial pulses two out of   four bilateral, capillary refill normal.   3. Musculoskeletal: All muscle groups are graded 5 out of 5 in the foot and ankle.  Exostosis formation noted to the dorsal right midfoot.  Pain noted exostosis site.  Compartments are soft and compressible.   4. Neurological: Normal sharp dull sensation; reflexes normal.          ASSESSMENT AND PLAN:   Diagnoses and all orders for this visit:    Arthritis of right midfoot  -     dexamethasone (Decadron) 4 MG/ML injection 2 mg  -     triamcinolone acetonide (Kenalog-10) 10 mg/mL injection    Right foot pain        Plan:    -Patient examined, chart history reviewed.  -Discussed etiology of condition and various treatment options.  -Reviewed prior x-ray findings--fairly advanced arthritic changes to midtarsal joints, especially first through third tarsometatarsal joints with exostosis formation.  This does correlate with area of patient's pain.  -Continue supportive Peters tennis shoes  -Patient to ambulate supportive shoes and avoid walking barefoot.  -Discussed cortisone injection to patient including benefits and risks.  Patient agreeable and written consent was obtained.  After prepping site with alcohol, administered cortisone injection to right midfoot tarsometatarsal joints consisting of 1 cc 0.5% Marcaine plain, 0.5 cc of Kenalog 10, 0.5 cc of dexamethasone.  Patient tolerated injection well and there were no complications.  Site was dressed with Band-Aid.  -If symptoms worsen or fail to improve could also consider midfoot fusion procedures.  Patient like  to avoid if possible, understandably.    RTC 3 to 4 months or sooner if other concerns arise.    The patient indicates understanding of these issues and agrees to the plan.        Kash Cano DPM    Dragon speech recognition software was used to prepare this note.  Errors in word recognition may occur.  Please contact me with any questions/concerns with this note.

## 2024-09-19 ENCOUNTER — TELEPHONE (OUTPATIENT)
Dept: INTERNAL MEDICINE CLINIC | Facility: CLINIC | Age: 74
End: 2024-09-19

## 2024-09-19 NOTE — TELEPHONE ENCOUNTER
It's a new clinic to assess findings like her's and make sure we don't miss something that could potentially become more problematic. It's closer monitoring.   Would recommend she see Dr. Tran's group.

## 2024-09-19 NOTE — TELEPHONE ENCOUNTER
----- Message from Deisy Perea sent at 9/19/2024 12:32 PM CDT -----  Regarding: RE: [facility] Pancreas [facility]  Hello,    That's good to know. I had referred her to GI, but I will place a referral for your office.     RN pool: please let pt know about new referral.     Thanks,   Deisy  ----- Message -----  From: Yan Tran MD  Sent: 9/19/2024   9:53 AM CDT  To: Deisy Perea MD  Subject: High Risk Pancreas Clinic                        Dr. Lake,  We in Surgical Oncology have recently established a [facility] and are capturing patient such as Ms. Cee with pancreas cysts.  We will be happy to consult with her and follow-up on any needed studies.    Thanks in advance,  Yan

## 2024-10-18 PROBLEM — J01.90 ACUTE BACTERIAL SINUSITIS: Status: ACTIVE | Noted: 2024-10-18

## 2024-10-18 PROBLEM — H26.493 OTHER SECONDARY CATARACT, BILATERAL: Status: ACTIVE | Noted: 2024-04-22

## 2024-10-18 PROBLEM — L65.9 ALOPECIA: Status: ACTIVE | Noted: 2024-10-18

## 2024-10-18 PROBLEM — R05.9 COUGH: Status: ACTIVE | Noted: 2024-10-18

## 2024-10-18 PROBLEM — B96.89 ACUTE BACTERIAL SINUSITIS: Status: ACTIVE | Noted: 2024-10-18

## 2024-10-18 PROBLEM — R03.0 ELEVATED BLOOD-PRESSURE READING WITHOUT DIAGNOSIS OF HYPERTENSION: Status: ACTIVE | Noted: 2024-10-18

## 2024-10-21 ENCOUNTER — OFFICE VISIT (OUTPATIENT)
Dept: SURGERY | Facility: CLINIC | Age: 74
End: 2024-10-21
Payer: MEDICARE

## 2024-10-21 VITALS
SYSTOLIC BLOOD PRESSURE: 159 MMHG | HEART RATE: 90 BPM | DIASTOLIC BLOOD PRESSURE: 78 MMHG | BODY MASS INDEX: 30 KG/M2 | WEIGHT: 171 LBS | OXYGEN SATURATION: 99 %

## 2024-10-21 DIAGNOSIS — K86.2 PANCREATIC CYST (HCC): Primary | ICD-10-CM

## 2024-10-21 PROCEDURE — 99205 OFFICE O/P NEW HI 60 MIN: CPT | Performed by: SURGERY

## 2024-10-21 NOTE — CONSULTS
[facility]      Patient Name:  Kavita Cee   YOB: 1950   Gender:  Female   Appt Date:  10/21/2024   Provider:  Yan Tran MD     PATIENT PROVIDERS  Referring Provider: Deisy Perea MD     Primary Care Provider:Deisy Perea MD   Address: [address] [place] [address]   Phone #: [phone]       CHIEF COMPLAINT  Chief Complaint   Patient presents with    Consult        PROBLEMS  Reviewed   Patient Active Problem List   Diagnosis    History of glaucoma    Osteopenia    Pneumonia    Other secondary cataract, bilateral    Familial combined hyperlipidemia    Disease of liver    Carpal tunnel syndrome    Alopecia    Pancreatic cyst (HCC)        History of Present Illness:  Patient is a 73 year old woman who is currently being referred for consideration of surgical oncology management of recently diagnosed pancreatic cyst.     3/29/2024: Total Bilirubin elevated 1.9   4/26/2024: Total Bilirubin elevated 1.9, AST elevated 40, Direct Bilirubin 0.6    7/10/2024: US Abdomen ordered for elevated liver enzymes showing mass near gallbladder fossa 3 x 2.6 x 2.3 cm hyperechoic with calcification possibly representing atypical hemangioma and an additional hyperechoic nodule posterior right hepatic lobe measuring 1 cm, as well as cystic lesion of the head of the pancreas measuring 1.4 cm. CBD measuring 5 mm.     9/4/2024: MRI Abdomen showing 3 complex cysts without enhancement in the liver, largest within the posterior segment of the right lobe medically measuring 2.3 x 2.4 cm. Cystic focus along the pancreatic head measuring 0.6 x 1.2 cm and an additional exophytic cystic focus along the pancreatic tail projecting cephalad toward the stomach measuring 1.3 x 1.1 cm. Main PD normal caliber. Cystic foci appear to communicate with the duct.        Vital Signs:  /78   Pulse 90   Wt 77.6 kg (171 lb)   SpO2 99%   BMI 29.72 kg/m²      Medications  Reviewed:    Current Outpatient Medications:     rosuvastatin 20 MG Oral Tab, Take 1 tablet (20 mg total) by mouth daily., Disp: 90 tablet, Rfl: 3    Calcium Carbonate-Vit D-Min (CALCIUM 1200 OR), Take by mouth As Directed., Disp: , Rfl:     Multiple Vitamins-Minerals (CENTRUM FRESH/FRUITY 50+ OR), Take by mouth As Directed., Disp: , Rfl:     clobetasol 0.05 % External Ointment, APPLY TO AFFECTED AREA TWICE DAILY FOR 7 DAYS THEN APPLY ONCE AT BEDTIME FOR 7 DAYS, Disp: , Rfl:     Cholecalciferol (VITAMIN D) 50 MCG (2000 UT) Oral Cap, Take 3 capsules (6,000 Units total) by mouth daily., Disp: , Rfl:      Allergies Reviewed:  Allergies[1]     History:  Reviewed:  Past Medical History:    Anxiety    High cholesterol      Reviewed:  Past Surgical History:   Procedure Laterality Date    Hx parotidectomy Right       Reviewed Social History:  Social History     Socioeconomic History    Marital status:    Tobacco Use    Smoking status: Never    Smokeless tobacco: Never   Vaping Use    Vaping status: Never Used   Substance and Sexual Activity    Alcohol use: Yes     Alcohol/week: 2.0 standard drinks of alcohol     Types: 2 Standard drinks or equivalent per week     Comment: Occ    Drug use: No   Other Topics Concern    Caffeine Concern No    Exercise No    Seat Belt No    Special Diet No    Stress Concern No    Weight Concern No     Social Drivers of Health      Received from [facility]      Reviewed:  Family History   Problem Relation Age of Onset    Cancer Father     Other (Alzheimer's disease) Mother       Review of Systems:  GENERAL HEALTH: feels well, no fatigue.   RESPIRATORY: denies shortness of breath  CARDIOVASCULAR: denies chest pain, SOB, edema,orthopnea, no palpitations   GI: denies nausea, vomiting, constipation, diarrhea; no rectal bleeding  GENITAL/: no blood in urine  MUSCULOSKELETAL: no joint complaints, no back pain  NEURO: no tingling, numbness, weakness  ENDOCRINE:  denies weight loss/gain  PSYCH: no mood changes       Physical Examination:  Constitutional: NAD.   Eyes: Sclera: non-icteric.   Lymph Nodes: Lymph Nodes no cervical LAD, supraclavicular LAD, axillary LAD, or inguinal LAD.   Lungs: Auscultation: breath sounds normal.   Cardiovascular: Heart Auscultation: RRR.   Abdomen: Inspection and Palpation: no masses, tenderness (no guarding, no rebound), or CVA tenderness and soft and non-distended. Liver: no hepatomegaly. Spleen: no splenomegaly. Hernia: none palpable.   Musculoskeletal: Extremities: no edema.   Skin: Inspection and palpation: no jaundice.      Document Review:  US Abdomen:  FINDINGS:    LIVER:  Mass near gallbladder fossa measures 3 x 2.6 x 2.3 cm.  This is mostly hyperechoic in may be associated with calcifications.  This could potentially represent an atypical hemangioma.  An additional hyperechoic nodule posterior right hepatic lobe   is noted measuring up to 1 cm in diameter.  Additional evaluation with contrast-enhanced MRI is recommended.   BILIARY:  There is a shadowing stone within gallbladder lumen.  There is no evidence of cholecystitis.  Common bile duct measures 5 mm.   PANCREAS:  Cystic lesion in the head of the pancreas measures up to 1.4 cm.   SPLEEN:  Normal.   KIDNEYS:  Normal.  Right kidney measures 10.7 cm.  Left kidney measures 10.7 cm.   AORTA/IVC:  Normal.     MRI Abdomen  Nonenhancing small cystic foci within the pancreas which appear to communicate with branch ducts of the main pancreatic duct.  The possibility of IPMN is not excluded.      Procedure(s):  None     Assessment / Plan:  Pancreatic cystic lesions  Findings, differential diagnosis, options, and recommendations were discussed with patient at length.  Pancreatic cystic lesions discussed as an entity.  At this time, clinical suspicion is low.  I recommended consultation with GI for consideration of EUS.  Rationale discussed.  Patient agreed.  Appropriate referrals will be  made.  Thus, final recommendations pending.  Patient had ample time to ask questions.             Electronically Signed by: Yan Tran MD           [1]   Allergies  Allergen Reactions    Other UNKNOWN

## 2024-11-16 PROBLEM — R79.89 ABNORMAL LFTS: Status: ACTIVE | Noted: 2024-11-16

## 2024-11-20 ENCOUNTER — LAB ENCOUNTER (OUTPATIENT)
Dept: LAB | Age: 74
End: 2024-11-20
Attending: INTERNAL MEDICINE
Payer: MEDICARE

## 2024-11-20 DIAGNOSIS — R79.89 ABNORMAL LFTS: ICD-10-CM

## 2024-11-20 LAB
ALBUMIN SERPL-MCNC: 4.5 G/DL (ref 3.2–4.8)
ALBUMIN/GLOB SERPL: 1.5 {RATIO} (ref 1–2)
ALP LIVER SERPL-CCNC: 68 U/L
ALT SERPL-CCNC: 36 U/L
ANION GAP SERPL CALC-SCNC: 4 MMOL/L (ref 0–18)
AST SERPL-CCNC: 34 U/L (ref ?–34)
BILIRUB DIRECT SERPL-MCNC: 0.5 MG/DL (ref ?–0.3)
BILIRUB SERPL-MCNC: 2 MG/DL (ref 0.2–1.1)
BUN BLD-MCNC: 12 MG/DL (ref 9–23)
CALCIUM BLD-MCNC: 10.1 MG/DL (ref 8.7–10.4)
CHLORIDE SERPL-SCNC: 106 MMOL/L (ref 98–112)
CO2 SERPL-SCNC: 24 MMOL/L (ref 21–32)
CREAT BLD-MCNC: 0.81 MG/DL
EGFRCR SERPLBLD CKD-EPI 2021: 77 ML/MIN/1.73M2 (ref 60–?)
FASTING STATUS PATIENT QL REPORTED: YES
GLOBULIN PLAS-MCNC: 3 G/DL (ref 2–3.5)
GLUCOSE BLD-MCNC: 92 MG/DL (ref 70–99)
OSMOLALITY SERPL CALC.SUM OF ELEC: 277 MOSM/KG (ref 275–295)
POTASSIUM SERPL-SCNC: 4.2 MMOL/L (ref 3.5–5.1)
PROT SERPL-MCNC: 7.5 G/DL (ref 5.7–8.2)
SODIUM SERPL-SCNC: 134 MMOL/L (ref 136–145)

## 2024-11-20 PROCEDURE — 80053 COMPREHEN METABOLIC PANEL: CPT

## 2024-11-20 PROCEDURE — 36415 COLL VENOUS BLD VENIPUNCTURE: CPT

## 2024-11-20 PROCEDURE — 82248 BILIRUBIN DIRECT: CPT

## 2024-12-11 ENCOUNTER — OFFICE VISIT (OUTPATIENT)
Dept: PODIATRY CLINIC | Facility: CLINIC | Age: 74
End: 2024-12-11
Payer: MEDICARE

## 2024-12-11 VITALS — SYSTOLIC BLOOD PRESSURE: 142 MMHG | DIASTOLIC BLOOD PRESSURE: 76 MMHG

## 2024-12-11 DIAGNOSIS — M79.671 RIGHT FOOT PAIN: ICD-10-CM

## 2024-12-11 DIAGNOSIS — M19.071 ARTHRITIS OF RIGHT MIDFOOT: Primary | ICD-10-CM

## 2024-12-11 PROCEDURE — 99213 OFFICE O/P EST LOW 20 MIN: CPT | Performed by: STUDENT IN AN ORGANIZED HEALTH CARE EDUCATION/TRAINING PROGRAM

## 2024-12-11 RX ORDER — DEXAMETHASONE SODIUM PHOSPHATE 4 MG/ML
2 VIAL (ML) INJECTION ONCE
Status: COMPLETED | OUTPATIENT
Start: 2024-12-11 | End: 2024-12-11

## 2024-12-11 RX ORDER — DEXAMETHASONE SODIUM PHOSPHATE 4 MG/ML
2 VIAL (ML) INJECTION ONCE
Status: SHIPPED | OUTPATIENT
Start: 2024-12-11

## 2024-12-11 NOTE — PROGRESS NOTES
[facility] Podiatry  Progress Note    Kavita Cee is a 74 year old female.   Chief Complaint   Patient presents with    Follow - Up     Right foot- patient denies pain          HPI:     Patient is a very pleasant 74-year-old female who presents to clinic for evaluation of pain to her right midfoot region.  She is doing better with cortisone injections and supportive Peters tennis shoes.  She has pain that she rates at a 3-4 out of 10 on and off and is hoping for another cortisone injection at this time.  It has been 4 months since her last injection.  No other complaints are mentioned.      Allergies: Other   Current Outpatient Medications   Medication Sig Dispense Refill    rosuvastatin 20 MG Oral Tab Take 1 tablet (20 mg total) by mouth daily. 90 tablet 3    Calcium Carbonate-Vit D-Min (CALCIUM 1200 OR) Take by mouth As Directed.      Multiple Vitamins-Minerals (CENTRUM FRESH/FRUITY 50+ OR) Take by mouth As Directed.      clobetasol 0.05 % External Ointment APPLY TO AFFECTED AREA TWICE DAILY FOR 7 DAYS THEN APPLY ONCE AT BEDTIME FOR 7 DAYS      Cholecalciferol (VITAMIN D) 50 MCG (2000 UT) Oral Cap Take 3 capsules (6,000 Units total) by mouth daily.        Past Medical History:    Anxiety    Arthritis    Change in hair    Hemorrhoids    High cholesterol    Pain in joints    Wears glasses    Weight gain      Past Surgical History:   Procedure Laterality Date    Colonoscopy      Hx parotidectomy Right       Family History   Problem Relation Age of Onset    Cancer Father     Other (Alzheimer's disease) Mother       Social History     Socioeconomic History    Marital status:    Tobacco Use    Smoking status: Former     Current packs/day: 0.00     Types: Cigarettes     Quit date: 12/10/1979     Years since quittin.0    Smokeless tobacco: Never   Vaping Use    Vaping status: Never Used   Substance and Sexual Activity    Alcohol use: Yes     Alcohol/week: 2.0 standard drinks of alcohol     Types:  2 Standard drinks or equivalent per week     Comment: Occ    Drug use: Never   Other Topics Concern    Caffeine Concern No    Exercise No    Seat Belt No    Special Diet No    Stress Concern No    Weight Concern No           REVIEW OF SYSTEMS:     Denies nausea, vomiting, fever, chills.      EXAM:   /76 (BP Location: Right arm, Patient Position: Sitting, Cuff Size: adult)   GENERAL: well developed, well nourished, in no apparent distress  EXTREMITIES:   1. Integument: Normal skin temperature and turgor  2. Vascular: Dorsalis pedis two out of four bilateral and posterior tibial pulses two out of   four bilateral, capillary refill normal.   3. Musculoskeletal: All muscle groups are graded 5 out of 5 in the foot and ankle.  Exostosis formation noted to the dorsal right midfoot.  Pain noted exostosis site.  Compartments are soft and compressible.   4. Neurological: Normal sharp dull sensation; reflexes normal.          ASSESSMENT AND PLAN:   Diagnoses and all orders for this visit:    Arthritis of right midfoot  -     dexamethasone (Decadron) 4 MG/ML injection 2 mg  -     triamcinolone acetonide (Kenalog-10) 10 mg/mL injection  -     dexamethasone (Decadron) 4 MG/ML injection 2 mg  -     triamcinolone acetonide (Kenalog-10) 10 mg/mL injection    Right foot pain          Plan:    -Patient examined, chart history reviewed.  -Discussed etiology of condition and various treatment options.  -Reviewed prior x-ray findings--fairly advanced arthritic changes to midtarsal joints, especially first through third tarsometatarsal joints with exostosis formation.  This does correlate with area of patient's pain.  -Continue supportive Peters tennis shoes  -Patient to ambulate supportive shoes and avoid walking barefoot.  -Discussed cortisone injection to patient including benefits and risks.  Patient agreeable and written consent was obtained.  After prepping site with alcohol, administered cortisone injection to right midfoot  tarsometatarsal joints consisting of 1 cc 0.5% Marcaine plain, 0.5 cc of Kenalog 10, 0.5 cc of dexamethasone.  Patient tolerated injection well and there were no complications.  Site was dressed with Band-Aid.  -If symptoms worsen or fail to improve could also consider midfoot fusion procedures.  Patient like to avoid if possible, understandably.    RTC 3 to 4 months or sooner if other concerns arise.    The patient indicates understanding of these issues and agrees to the plan.        Kash Cano DPM    Dragon speech recognition software was used to prepare this note.  Errors in word recognition may occur.  Please contact me with any questions/concerns with this note.

## 2025-02-20 DIAGNOSIS — E78.5 HYPERLIPIDEMIA, UNSPECIFIED HYPERLIPIDEMIA TYPE: ICD-10-CM

## 2025-02-20 RX ORDER — ROSUVASTATIN CALCIUM 20 MG/1
20 TABLET, COATED ORAL DAILY
Qty: 90 TABLET | Refills: 0 | Status: SHIPPED | OUTPATIENT
Start: 2025-02-20

## 2025-02-20 NOTE — TELEPHONE ENCOUNTER
Rosuvastatin 20 mg  Filled 1-15-24  Qty 90  3 refills  Future Appointments   Date Time Provider Department Center   4/9/2025  9:30 AM Kash Cano DPM [id number]   4/16/2025  8:20 AM Deisy Olivas MD [facility] [place]   LOV 1-11-24 DV  Labs 3-29-24 [facility]

## 2025-02-20 NOTE — TELEPHONE ENCOUNTER
Pt requesting medication refill. DailyTicket #: [id number]    Patient out of medication.    rosuvastatin 20 MG Oral Tab 90 tablet

## 2025-04-09 ENCOUNTER — OFFICE VISIT (OUTPATIENT)
Dept: PODIATRY CLINIC | Facility: CLINIC | Age: 75
End: 2025-04-09

## 2025-04-09 VITALS — DIASTOLIC BLOOD PRESSURE: 64 MMHG | SYSTOLIC BLOOD PRESSURE: 128 MMHG

## 2025-04-09 DIAGNOSIS — M19.071 ARTHRITIS OF RIGHT MIDFOOT: Primary | ICD-10-CM

## 2025-04-09 DIAGNOSIS — M79.671 RIGHT FOOT PAIN: ICD-10-CM

## 2025-04-09 PROCEDURE — 99213 OFFICE O/P EST LOW 20 MIN: CPT | Performed by: STUDENT IN AN ORGANIZED HEALTH CARE EDUCATION/TRAINING PROGRAM

## 2025-04-09 RX ORDER — DEXAMETHASONE SODIUM PHOSPHATE 4 MG/ML
2 VIAL (ML) INJECTION ONCE
Status: DISCONTINUED | OUTPATIENT
Start: 2025-04-09 | End: 2025-04-13

## 2025-04-09 NOTE — PROGRESS NOTES
[facility]  Progress Note    Kavita Cee is a 74 year old female.   Chief Complaint   Patient presents with    Foot Pain     F/u right foot pain. She got a cortisone injection on 2024. Pain 9/10         HPI:     Patient is a very pleasant 74-year-old female who presents to clinic for evaluation of pain to her right midfoot region.  She is doing better with cortisone injections and supportive Peters tennis shoes.  She has pain that she rates at a 9 out of 10 on and off and is hoping for another cortisone injection at this time.  It has been 4 months since her last injection.  She did recently get new Peters tennis shoes which as helping her. No other complaints are mentioned.      Allergies: Patient has no active allergies.   Current Outpatient Medications   Medication Sig Dispense Refill    rosuvastatin 20 MG Oral Tab Take 1 tablet (20 mg total) by mouth daily. 90 tablet 0    Calcium Carbonate-Vit D-Min (CALCIUM 1200 OR) Take by mouth As Directed.      Multiple Vitamins-Minerals (CENTRUM FRESH/FRUITY 50+ OR) Take by mouth As Directed.      clobetasol 0.05 % External Ointment APPLY TO AFFECTED AREA TWICE DAILY FOR 7 DAYS THEN APPLY ONCE AT BEDTIME FOR 7 DAYS      Cholecalciferol (VITAMIN D) 50 MCG (2000 UT) Oral Cap Take 3 capsules (6,000 Units total) by mouth daily.        Past Medical History:    Anxiety    Arthritis    Change in hair    Hemorrhoids    High cholesterol    Pain in joints    Wears glasses    Weight gain      Past Surgical History:   Procedure Laterality Date    Colonoscopy      Hx parotidectomy Right       Family History   Problem Relation Age of Onset    Cancer Father     Other (Alzheimer's disease) Mother       Social History     Socioeconomic History    Marital status:    Tobacco Use    Smoking status: Former     Current packs/day: 0.00     Types: Cigarettes     Quit date: 12/10/1979     Years since quittin.3    Smokeless tobacco: Never   Vaping Use     Vaping status: Never Used   Substance and Sexual Activity    Alcohol use: Yes     Alcohol/week: 2.0 standard drinks of alcohol     Types: 2 Standard drinks or equivalent per week     Comment: Occ    Drug use: Never   Other Topics Concern    Caffeine Concern No    Exercise No    Seat Belt No    Special Diet No    Stress Concern No    Weight Concern No           REVIEW OF SYSTEMS:     Denies nausea, vomiting, fever, chills.      EXAM:   /64 (BP Location: Left arm, Patient Position: Sitting, Cuff Size: adult)   GENERAL: well developed, well nourished, in no apparent distress  EXTREMITIES:   1. Integument: Normal skin temperature and turgor  2. Vascular: Dorsalis pedis two out of four bilateral and posterior tibial pulses two out of   four bilateral, capillary refill normal.   3. Musculoskeletal: All muscle groups are graded 5 out of 5 in the foot and ankle.  Exostosis formation noted to the dorsal right midfoot.  Pain noted exostosis site.  Compartments are soft and compressible.   4. Neurological: Normal sharp dull sensation; reflexes normal.          ASSESSMENT AND PLAN:   Diagnoses and all orders for this visit:    Arthritis of right midfoot    Right foot pain          Plan:    -Patient examined, chart history reviewed.  -Discussed etiology of condition and various treatment options.  -Reviewed prior x-ray findings--fairly advanced arthritic changes to midtarsal joints, especially first through third tarsometatarsal joints with exostosis formation.  This does correlate with area of patient's pain.  -Continue supportive Peters tennis shoes  -Patient to ambulate supportive shoes and avoid walking barefoot.  -Discussed cortisone injection to patient including benefits and risks.  Patient agreeable and written consent was obtained.  After prepping site with alcohol, administered cortisone injection to right midfoot tarsometatarsal joints consisting of 1 cc 0.5% Marcaine plain, 0.5 cc of Kenalog 10, 0.5 cc of  dexamethasone.  Patient tolerated injection well and there were no complications.  Site was dressed with Band-Aid.  -If symptoms worsen or fail to improve could also consider midfoot fusion procedures.  Patient like to avoid if possible, understandably.    RTC 3 to 4 months or sooner if other concerns arise.    The patient indicates understanding of these issues and agrees to the plan.        Kash Cano DPM    Dragon speech recognition software was used to prepare this note.  Errors in word recognition may occur.  Please contact me with any questions/concerns with this note.

## 2025-04-09 NOTE — PROGRESS NOTES
Per Dr Cano  to draw up .5ml of dexamethasone sodium phosphate, .5ml kenalog-10 and 1ml marcaine 0.5% for a right foot injection.

## 2025-04-13 RX ORDER — DEXAMETHASONE SODIUM PHOSPHATE 10 MG/ML
10 INJECTION, SOLUTION INTRA-ARTICULAR; INTRALESIONAL; INTRAMUSCULAR; INTRAVENOUS; SOFT TISSUE ONCE
Status: SHIPPED | OUTPATIENT
Start: 2025-04-13

## 2025-04-16 ENCOUNTER — OFFICE VISIT (OUTPATIENT)
Dept: INTERNAL MEDICINE CLINIC | Facility: CLINIC | Age: 75
End: 2025-04-16
Payer: MEDICARE

## 2025-04-16 VITALS
HEART RATE: 79 BPM | DIASTOLIC BLOOD PRESSURE: 68 MMHG | WEIGHT: 173.19 LBS | TEMPERATURE: 98 F | SYSTOLIC BLOOD PRESSURE: 130 MMHG | HEIGHT: 64 IN | OXYGEN SATURATION: 98 % | BODY MASS INDEX: 29.57 KG/M2

## 2025-04-16 DIAGNOSIS — R79.9 ABNORMAL FINDING OF BLOOD CHEMISTRY, UNSPECIFIED: ICD-10-CM

## 2025-04-16 DIAGNOSIS — H40.89 OTHER SPECIFIED GLAUCOMA, UNSPECIFIED LATERALITY: ICD-10-CM

## 2025-04-16 DIAGNOSIS — E80.4 GILBERT'S DISEASE: ICD-10-CM

## 2025-04-16 DIAGNOSIS — Z00.00 ENCOUNTER FOR ANNUAL WELLNESS VISIT (AWV) IN MEDICARE PATIENT: Primary | ICD-10-CM

## 2025-04-16 DIAGNOSIS — Z12.11 SCREENING FOR COLON CANCER: ICD-10-CM

## 2025-04-16 DIAGNOSIS — Z12.31 VISIT FOR SCREENING MAMMOGRAM: ICD-10-CM

## 2025-04-16 DIAGNOSIS — E78.5 HYPERLIPIDEMIA, UNSPECIFIED HYPERLIPIDEMIA TYPE: ICD-10-CM

## 2025-04-16 DIAGNOSIS — R92.30 DENSE BREAST TISSUE ON MAMMOGRAM, UNSPECIFIED TYPE: ICD-10-CM

## 2025-04-16 DIAGNOSIS — K86.2 PANCREATIC CYST (HCC): ICD-10-CM

## 2025-04-16 DIAGNOSIS — G56.02 CARPAL TUNNEL SYNDROME OF LEFT WRIST: ICD-10-CM

## 2025-04-16 DIAGNOSIS — N64.59 INVERTED NIPPLE: ICD-10-CM

## 2025-04-16 DIAGNOSIS — M85.80 OSTEOPENIA, UNSPECIFIED LOCATION: ICD-10-CM

## 2025-04-16 PROBLEM — R79.89 ABNORMAL LFTS: Status: RESOLVED | Noted: 2024-11-16 | Resolved: 2025-04-16

## 2025-04-16 NOTE — H&P
Subjective:   Kavita Cee is a 74 year old female who presents for a Subsequent Annual Wellness visit (Pt already had Initial Annual Wellness) and the following:       The following individual(s) verbally consented to be recorded using ambient AI listening technology and understand that they can each withdraw their consent to this listening technology at any point by asking the clinician to turn off or pause the recording:    Patient name: Kvaita Cee    DEXA due 2/2026  History of Present Illness  The patient, with a history of osteopenia, dense breasts, Gilbert's disease, and a pancreatic cyst, presents for a Medicare wellness visit. The patient reports no new symptoms related to these conditions.  is due for another mammogram. The patient also reports inversion of one nipple for approximately two years. Mammograms have been wnl.  The patient's pancreatic cyst was incidentally noted, and the patient is due for an MRI. The patient also reports symptoms of carpal tunnel syndrome in the left hand, including numbness and decreased strength. The patient denies pain with wrist rotation.    History/Other:   Fall Risk Assessment:   She has been screened for Falls and is low risk.      Cognitive Assessment:   She had a completely normal cognitive assessment - see flowsheet entries     Functional Ability/Status:   Kavita Cee has some abnormal functions as listed below:  She has Vision problems based on screening of functional status.       Depression Screening (PHQ):  PHQ-2 SCORE: 0  , done 4/10/2025   Last [place] Suicide Screening on 4/16/2025 was No Risk.         Advanced Directives:   She does have a Living Will but we do NOT have it on file in Epic.    She does have a POA but we do NOT have it on file in Epic.    Not discussed      Problem List[1]  Allergies:  She has no known allergies.    Current Medications:  Active Meds, Sig Only[2]    Medical History:  She  has a past medical history of  Anxiety, Arthritis, Change in hair (2019), Hemorrhoids, High cholesterol, Hyperlipidemia, Pain in joints, Wears glasses, and Weight gain.  Surgical History:  She  has a past surgical history that includes hx parotidectomy (Right); colonoscopy; cataract; glaucoma surgery; and .   Family History:  Her family history includes Alzheimer's disease in her mother; Cancer in her father; Dementia in her mother.  Social History:  She  reports that she quit smoking about 45 years ago. Her smoking use included cigarettes. She has never used smokeless tobacco. She reports current alcohol use of about 2.0 standard drinks of alcohol per week. She reports that she does not use drugs.    Tobacco:  She smoked tobacco in the past but quit greater than 12 months ago.  Tobacco Use[3]     CAGE Alcohol Screen:   CAGE screening score of 0 on 4/10/2025, showing low risk of alcohol abuse.      Patient Care Team:  Deisy Olivas MD as PCP - General (Internal Medicine)  Cristino Basilio MD (GASTROENTEROLOGY)    Review of Systems  As in HPI     Objective:   Physical Exam  Physical Exam  GENERAL: Alert, cooperative, well developed, no acute distress.  HEENT: Normocephalic, normal oropharynx, moist mucous membranes, eyes -R with asymmetry since glaucoma event; ears normal.  NECK: Neck wnl  CHEST: Clear to auscultation bilaterally, no wheezes, rhonchi, or crackles.  CARDIOVASCULAR: Normal heart rate and rhythm, S1 and S2 normal without murmurs.  BREAST: Breasts: no masses or nipple discharge or rashes/lesions noted; chronic L nipple inversion   ABDOMEN: Soft, non-tender, non-distended  EXTREMITIES: No edema, hand  strength reduced.  MUSCULOSKELETAL: good  normal range of motion.  NEUROLOGICAL: Cranial nerves grossly intact, moves all extremities without gross motor or sensory deficit.        /68 (BP Location: Right arm, Patient Position: Sitting, Cuff Size: adult)   Pulse 79   Temp 98.3 °F (36.8 °C) (Temporal)   Ht 5' 4\"  (1.626 m)   Wt 173 lb 3.2 oz (78.6 kg)   SpO2 98%   BMI 29.73 kg/m²  Estimated body mass index is 29.73 kg/m² as calculated from the following:    Height as of this encounter: 5' 4\" (1.626 m).    Weight as of this encounter: 173 lb 3.2 oz (78.6 kg).    Medicare Hearing Assessment:   Hearing Screening    Screening Method: Whisper Test  Whisper Test Result: Pass               Assessment & Plan:   Kavita Cee is a 74 year old female who presents for a Medicare Assessment.     Assessment & Plan  Pancreatic Cyst  Incidentally identified. MRI every six months for two years, then annually if stable. Discussed specialist's recommendation for annual MRI.  - Order MRI for pancreatic cyst.  - Refer to GI for follow-up.    Colorectal Cancer Screening  No recent colonoscopy. Discussed colonoscopy   - Refer to GI for colonoscopy.    Breast Cancer Screening  Chronic inverted nipple   Dense breasts with normal mammograms. Discussed additional imaging with ultrasound. Referral to breast specialist offered for reassurance.  - Order mammogram.  - Order breast ultrasound.  - Refer to breast specialist    Osteopenia  Osteopenia on previous DEXA. Advised vitamin D, calcium, and weight-bearing exercises. Recheck bone density in February 2026.  - Advise vitamin D and calcium intake.  - Recommend light weight-bearing exercises.  - Schedule DEXA scan for February 2026.    Carpal Tunnel Syndrome  Numbness in fingers, likely carpal tunnel syndrome.  - Refer to hand specialist for evaluation and potential carpal tunnel release.    Gilbert's Disease  Benign condition with slightly elevated bilirubin. No intervention unless symptoms change.  - Order liver enzyme tests.  GI follow-up     General Health Maintenance  Borderline elevated blood pressure, normal at home. Discussed shingles and pneumonia vaccinations.  - Advise home blood pressure monitoring.  - Consider shingles and pneumonia vaccinations.  -follows with derm     The patient  indicates understanding of these issues and agrees to the plan.  Reinforced healthy diet, lifestyle, and exercise.      No follow-ups on file.     Deisy Perea MD, 4/16/2025     Supplementary Documentation:   General Health:  In the past six months, have you lost more than 10 pounds without trying?: (Patient-Rptd) 2 - No  Has your appetite been poor?: (Patient-Rptd) No  Type of Diet: (Patient-Rptd) Balanced  How does the patient maintain a good energy level?: (Patient-Rptd) Appropriate Exercise, Daily Walks, Stretching  How would you describe your daily physical activity?: (Patient-Rptd) Moderate  How would you describe your current health state?: (Patient-Rptd) Good  How do you maintain positive mental well-being?: (Patient-Rptd) Social Interaction, Games, Visiting Friends, Visiting Family  On a scale of 0 to 10, with 0 being no pain and 10 being severe pain, what is your pain level?: (Patient-Rptd) 3 - (Mild)  In the past six months, have you experienced urine leakage?: (Patient-Rptd) 0-No  At any time do you feel concerned for the safety/well-being of yourself and/or your children, in your home or elsewhere?: (Patient-Rptd) No  Have you had any immunizations at another office such as Influenza, Hepatitis B, Tetanus, or Pneumococcal?: (Patient-Rptd) No             [1]   Patient Active Problem List  Diagnosis    Other specified glaucoma    Osteopenia    Other secondary cataract, bilateral    Familial combined hyperlipidemia    Carpal tunnel syndrome    Alopecia    Pancreatic cyst (HCC)    Gilbert's disease   [2]   Outpatient Medications Marked as Taking for the 4/16/25 encounter (Office Visit) with Deisy Olivas MD   Medication Sig    rosuvastatin 20 MG Oral Tab Take 1 tablet (20 mg total) by mouth daily.    Calcium Carbonate-Vit D-Min (CALCIUM 1200 OR) Take by mouth As Directed.    Multiple Vitamins-Minerals (CENTRUM FRESH/FRUITY 50+ OR) Take by mouth As Directed.    clobetasol 0.05 % External  Ointment APPLY TO AFFECTED AREA TWICE DAILY FOR 7 DAYS THEN APPLY ONCE AT BEDTIME FOR 7 DAYS    Cholecalciferol (VITAMIN D) 50 MCG (2000 UT) Oral Cap Take 3 capsules (6,000 Units total) by mouth daily.     Current Facility-Administered Medications for the 25 encounter (Office Visit) with Deisy Olivas MD   Medication    Dexamethasone (Decadron) 10 MG/ML injection 10 mg    dexamethasone (Decadron) 4 MG/ML injection 2 mg    triamcinolone acetonide (Kenalog-10) 10 mg/mL injection   [3]   Social History  Tobacco Use   Smoking Status Former    Current packs/day: 0.00    Types: Cigarettes    Quit date: 12/10/1979    Years since quittin.3   Smokeless Tobacco Never

## 2025-04-24 ENCOUNTER — LAB ENCOUNTER (OUTPATIENT)
Dept: LAB | Age: 75
End: 2025-04-24
Attending: INTERNAL MEDICINE
Payer: MEDICARE

## 2025-04-24 DIAGNOSIS — E80.4 GILBERT'S DISEASE: ICD-10-CM

## 2025-04-24 DIAGNOSIS — R79.9 ABNORMAL FINDING OF BLOOD CHEMISTRY, UNSPECIFIED: ICD-10-CM

## 2025-04-24 DIAGNOSIS — Z00.00 ENCOUNTER FOR ANNUAL WELLNESS VISIT (AWV) IN MEDICARE PATIENT: ICD-10-CM

## 2025-04-24 DIAGNOSIS — H40.89 OTHER SPECIFIED GLAUCOMA, UNSPECIFIED LATERALITY: ICD-10-CM

## 2025-04-24 DIAGNOSIS — K86.2 PANCREATIC CYST (HCC): ICD-10-CM

## 2025-04-24 DIAGNOSIS — E78.5 HYPERLIPIDEMIA, UNSPECIFIED HYPERLIPIDEMIA TYPE: ICD-10-CM

## 2025-04-24 DIAGNOSIS — M85.80 OSTEOPENIA, UNSPECIFIED LOCATION: ICD-10-CM

## 2025-04-24 LAB
ALBUMIN SERPL-MCNC: 4.7 G/DL (ref 3.2–4.8)
ALBUMIN/GLOB SERPL: 2.4 {RATIO} (ref 1–2)
ALP LIVER SERPL-CCNC: 66 U/L (ref 55–142)
ALT SERPL-CCNC: 47 U/L (ref 10–49)
ANION GAP SERPL CALC-SCNC: 6 MMOL/L (ref 0–18)
AST SERPL-CCNC: 35 U/L (ref ?–34)
BASOPHILS # BLD AUTO: 0.06 X10(3) UL (ref 0–0.2)
BASOPHILS NFR BLD AUTO: 1.1 %
BILIRUB SERPL-MCNC: 1.6 MG/DL (ref 0.2–1.1)
BUN BLD-MCNC: 15 MG/DL (ref 9–23)
CALCIUM BLD-MCNC: 9.8 MG/DL (ref 8.7–10.6)
CHLORIDE SERPL-SCNC: 107 MMOL/L (ref 98–112)
CHOLEST SERPL-MCNC: 184 MG/DL (ref ?–200)
CO2 SERPL-SCNC: 26 MMOL/L (ref 21–32)
CREAT BLD-MCNC: 0.87 MG/DL (ref 0.55–1.02)
EGFRCR SERPLBLD CKD-EPI 2021: 70 ML/MIN/1.73M2 (ref 60–?)
EOSINOPHIL # BLD AUTO: 0.23 X10(3) UL (ref 0–0.7)
EOSINOPHIL NFR BLD AUTO: 4.2 %
ERYTHROCYTE [DISTWIDTH] IN BLOOD BY AUTOMATED COUNT: 12.7 %
EST. AVERAGE GLUCOSE BLD GHB EST-MCNC: 114 MG/DL (ref 68–126)
FASTING PATIENT LIPID ANSWER: YES
FASTING STATUS PATIENT QL REPORTED: YES
GLOBULIN PLAS-MCNC: 2 G/DL (ref 2–3.5)
GLUCOSE BLD-MCNC: 98 MG/DL (ref 70–99)
HBA1C MFR BLD: 5.6 % (ref ?–5.7)
HCT VFR BLD AUTO: 40.9 % (ref 35–48)
HDLC SERPL-MCNC: 60 MG/DL (ref 40–59)
HGB BLD-MCNC: 13.6 G/DL (ref 12–16)
IMM GRANULOCYTES # BLD AUTO: 0.03 X10(3) UL (ref 0–1)
IMM GRANULOCYTES NFR BLD: 0.6 %
LDLC SERPL CALC-MCNC: 105 MG/DL (ref ?–100)
LYMPHOCYTES # BLD AUTO: 2.11 X10(3) UL (ref 1–4)
LYMPHOCYTES NFR BLD AUTO: 38.9 %
MCH RBC QN AUTO: 29.3 PG (ref 26–34)
MCHC RBC AUTO-ENTMCNC: 33.3 G/DL (ref 31–37)
MCV RBC AUTO: 88.1 FL (ref 80–100)
MONOCYTES # BLD AUTO: 0.5 X10(3) UL (ref 0.1–1)
MONOCYTES NFR BLD AUTO: 9.2 %
NEUTROPHILS # BLD AUTO: 2.5 X10 (3) UL (ref 1.5–7.7)
NEUTROPHILS # BLD AUTO: 2.5 X10(3) UL (ref 1.5–7.7)
NEUTROPHILS NFR BLD AUTO: 46 %
NONHDLC SERPL-MCNC: 124 MG/DL (ref ?–130)
OSMOLALITY SERPL CALC.SUM OF ELEC: 289 MOSM/KG (ref 275–295)
PLATELET # BLD AUTO: 203 10(3)UL (ref 150–450)
POTASSIUM SERPL-SCNC: 4.2 MMOL/L (ref 3.5–5.1)
PROT SERPL-MCNC: 6.7 G/DL (ref 5.7–8.2)
RBC # BLD AUTO: 4.64 X10(6)UL (ref 3.8–5.3)
SODIUM SERPL-SCNC: 139 MMOL/L (ref 136–145)
TRIGL SERPL-MCNC: 109 MG/DL (ref 30–149)
TSI SER-ACNC: 2.85 UIU/ML (ref 0.55–4.78)
VIT D+METAB SERPL-MCNC: 72.1 NG/ML (ref 30–100)
VLDLC SERPL CALC-MCNC: 18 MG/DL (ref 0–30)
WBC # BLD AUTO: 5.4 X10(3) UL (ref 4–11)

## 2025-04-24 PROCEDURE — 84443 ASSAY THYROID STIM HORMONE: CPT

## 2025-04-24 PROCEDURE — 80061 LIPID PANEL: CPT

## 2025-04-24 PROCEDURE — 36415 COLL VENOUS BLD VENIPUNCTURE: CPT

## 2025-04-24 PROCEDURE — 82306 VITAMIN D 25 HYDROXY: CPT

## 2025-04-24 PROCEDURE — 80053 COMPREHEN METABOLIC PANEL: CPT

## 2025-04-24 PROCEDURE — 85025 COMPLETE CBC W/AUTO DIFF WBC: CPT

## 2025-04-24 PROCEDURE — 83036 HEMOGLOBIN GLYCOSYLATED A1C: CPT

## 2025-05-17 DIAGNOSIS — E78.5 HYPERLIPIDEMIA, UNSPECIFIED HYPERLIPIDEMIA TYPE: ICD-10-CM

## 2025-05-19 RX ORDER — ROSUVASTATIN CALCIUM 20 MG/1
20 TABLET, COATED ORAL DAILY
Qty: 90 TABLET | Refills: 3 | Status: SHIPPED | OUTPATIENT
Start: 2025-05-19

## 2025-05-19 NOTE — TELEPHONE ENCOUNTER
Requesting    Name from pharmacy: ROSUVASTATIN 20MG TABLETS         Will file in chart as: ROSUVASTATIN 20 MG Oral Tab    Sig: TAKE 1 TABLET(20 MG) BY MOUTH DAILY    Disp: 90 tablet    Refills: 0 (Pharmacy requested: Not specified)    Start: 5/17/2025    Class: Normal    Non-formulary For: Hyperlipidemia, unspecified hyperlipidemia type    Last ordered: 2 months ago (2/20/2025) by Deisy Perea MD    Last refill: 2/20/2025    Rx #: [id number]    Cholesterol Medication Protocol [id number]/17/2025 01:28 PM   Protocol Details ALT < 80    ALT resulted within past year    Lipid panel within past 12 months    In person appointment or virtual visit in the past 12 mos or appointment in next 3 mos    Medication is active on med list      To be filled at: [id number] - [place], [place] - [address] AT [facility], [phone], [phone]     LOV: 04/16/25 w/ DVF  RTC: No Follow Up on File   Last Relevant Labs: 04/24/25  Future Appointments   Date Time Provider Department Center   5/21/2025  8:00 AM [facility] [place]   7/23/2025  9:30 AM Kash Cano DPM [id number]

## 2025-05-21 ENCOUNTER — HOSPITAL ENCOUNTER (OUTPATIENT)
Dept: MAMMOGRAPHY | Age: 75
Discharge: HOME | End: 2025-05-21
Attending: INTERNAL MEDICINE
Payer: MEDICARE

## 2025-05-21 DIAGNOSIS — Z12.31 VISIT FOR SCREENING MAMMOGRAM: ICD-10-CM

## 2025-05-21 PROCEDURE — 77063 BREAST TOMOSYNTHESIS BI: CPT | Performed by: INTERNAL MEDICINE

## 2025-05-21 PROCEDURE — 77067 SCR MAMMO BI INCL CAD: CPT | Performed by: INTERNAL MEDICINE

## 2025-07-23 ENCOUNTER — OFFICE VISIT (OUTPATIENT)
Dept: PODIATRY CLINIC | Facility: CLINIC | Age: 75
End: 2025-07-23

## 2025-07-23 VITALS — SYSTOLIC BLOOD PRESSURE: 140 MMHG | DIASTOLIC BLOOD PRESSURE: 76 MMHG

## 2025-07-23 DIAGNOSIS — M19.071 ARTHRITIS OF RIGHT MIDFOOT: Primary | ICD-10-CM

## 2025-07-23 DIAGNOSIS — M79.671 RIGHT FOOT PAIN: ICD-10-CM

## 2025-07-23 PROCEDURE — 99214 OFFICE O/P EST MOD 30 MIN: CPT | Performed by: STUDENT IN AN ORGANIZED HEALTH CARE EDUCATION/TRAINING PROGRAM

## 2025-07-23 RX ORDER — DEXAMETHASONE SODIUM PHOSPHATE 10 MG/ML
10 INJECTION, SOLUTION INTRA-ARTICULAR; INTRALESIONAL; INTRAMUSCULAR; INTRAVENOUS; SOFT TISSUE ONCE
Status: COMPLETED | OUTPATIENT
Start: 2025-07-23 | End: 2025-07-23

## 2025-07-23 NOTE — PROGRESS NOTES
[facility]  Progress Note    Kavita Cee is a 74 year old female.   Chief Complaint   Patient presents with    Foot Pain     Right foot pain f/u - last received a cortisone injection 25 -  pain rated 2-10/10 on and off - would like a another injection          HPI:     Patient is a very pleasant 74-year-old female who presents to clinic for evaluation of pain to her right midfoot region.  She is doing better with cortisone injections and supportive Peters tennis shoes.  She has pain that ranges from a 2-10 out of 10 on and off and is hoping for another cortisone injection at this time.  It has been 4 months since her last injection.  She does ambulate with new Peters tennis shoes which as helping her. No other complaints are mentioned.      Allergies: Patient has no known allergies.   Current Outpatient Medications   Medication Sig Dispense Refill    rosuvastatin 20 MG Oral Tab Take 1 tablet (20 mg total) by mouth daily. 90 tablet 3    Calcium Carbonate-Vit D-Min (CALCIUM 1200 OR) Take by mouth As Directed.      Multiple Vitamins-Minerals (CENTRUM FRESH/FRUITY 50+ OR) Take by mouth As Directed.      clobetasol 0.05 % External Ointment APPLY TO AFFECTED AREA TWICE DAILY FOR 7 DAYS THEN APPLY ONCE AT BEDTIME FOR 7 DAYS      Cholecalciferol (VITAMIN D) 50 MCG (2000 UT) Oral Cap Take 3 capsules (6,000 Units total) by mouth daily.        Past Medical History:    Anxiety    Arthritis    Change in hair    Hemorrhoids    High cholesterol    Hyperlipidemia    Pain in joints    Wears glasses    Weight gain      Past Surgical History:   Procedure Laterality Date    Cataract      Colonoscopy      Glaucoma surgery      Hx parotidectomy Right             Family History   Problem Relation Age of Onset    Cancer Father     Other (Alzheimer's disease) Mother     Dementia Mother       Social History     Socioeconomic History    Marital status:    Tobacco Use    Smoking status: Former     Current  packs/day: 0.00     Types: Cigarettes     Quit date: 12/10/1979     Years since quittin.6    Smokeless tobacco: Never   Vaping Use    Vaping status: Never Used   Substance and Sexual Activity    Alcohol use: Yes     Alcohol/week: 2.0 standard drinks of alcohol     Types: 2 Standard drinks or equivalent per week     Comment: Occ    Drug use: Never   Other Topics Concern    Caffeine Concern No    Exercise No    Seat Belt No    Special Diet No    Stress Concern No    Weight Concern No           REVIEW OF SYSTEMS:     Denies nausea, vomiting, fever, chills.      EXAM:   There were no vitals taken for this visit.  GENERAL: well developed, well nourished, in no apparent distress  EXTREMITIES:   1. Integument: Normal skin temperature and turgor  2. Vascular: Dorsalis pedis two out of four bilateral and posterior tibial pulses two out of   four bilateral, capillary refill normal.   3. Musculoskeletal: All muscle groups are graded 5 out of 5 in the foot and ankle.  Exostosis formation noted to the dorsal right midfoot.  Pain noted exostosis site.  Compartments are soft and compressible.   4. Neurological: Normal sharp dull sensation; reflexes normal.          ASSESSMENT AND PLAN:   There are no diagnoses linked to this encounter.        Plan:    -Patient examined, chart history reviewed.  -Discussed etiology of condition and various treatment options.  -Reviewed prior x-ray findings--fairly advanced arthritic changes to midtarsal joints, especially first through third tarsometatarsal joints with exostosis formation.  This does correlate with area of patient's pain.  -Continue supportive Peters tennis shoes  -Patient to ambulate supportive shoes and avoid walking barefoot.  -Discussed cortisone injection to patient including benefits and risks.  Patient agreeable and written consent was obtained.  After prepping site with alcohol, administered cortisone injection to right midfoot tarsometatarsal joints consisting of 1 cc  0.5% Marcaine plain, 0.5 cc of Kenalog 10, 0.5 cc of dexamethasone.  Patient tolerated injection well and there were no complications.  Site was dressed with Band-Aid.  -If symptoms worsen or fail to improve could also consider midfoot fusion procedures.  Patient like to avoid if possible, understandably.    RTC 3 to 4 months or sooner if other concerns arise.    The patient indicates understanding of these issues and agrees to the plan.        Kash Cano DPM    Dragon speech recognition software was used to prepare this note.  Errors in word recognition may occur.  Please contact me with any questions/concerns with this note.

## (undated) NOTE — LETTER
AUTHORIZATION FOR SURGICAL OPERATION OR OTHER PROCEDURE    1. I hereby authorize , and [facility] staff assigned to my case to perform the following operation and/or procedure at the [facility] site:    _______________________________________________________________________________________________    Right foot cortisone injection   _______________________________________________________________________________________________    2.  My physician has explained the nature and purpose of the operation or other procedure, possible alternative methods of treatment, the risks involved, and the possibility of complication to me.  I acknowledge that no guarantee has been made as to the result that may be obtained.  3.  I recognize that, during the course of this operation, or other procedure, unforseen conditions may necessitate additional or different procedure than those listed above.  I, therefore, further authorize and request that the above named physician, his/her physician assistants or designees perform such procedures as are, in his/her professional opinion, necessary and desirable.  4.  Any tissue or organs removed in the operation or other procedure may be disposed of by and at the discretion of the [facility].  5.  I understand that in the event of a medical emergency, I will be transported by local paramedics to [facility] or other hospital emergency department.  6.  I certify that I have read and fully understand the above consent to operation and/or other procedure.    7.  I acknowledge that my physician has explained sedation/analgesia administration to me including the risks and benefits.  I consent to the administration of sedation/analgesia as may be necessary or desirable in the judgement of my physician.    Witness signature: ___________________________________________________ Date:  ______/______/_____                     Time:  ________ A.M.  P.M.       Patient Name:  ______________________________________________________  (please print)      Patient signature:  ___________________________________________________             Relationship to Patient:           []  Parent    Responsible person                          []  Spouse  In case of minor or                    [] Other  _____________   Incompetent name:  __________________________________________________                               (please print)      _____________      Responsible person  In case of minor or  Incompetent signature:  _______________________________________________    Statement of Physician  My signature below affirms that prior to the time of the procedure, I have explained to the patient and/or his/her guardian, the risks and benefits involved in the proposed treatment and any reasonable alternative to the proposed treatment.  I have also explained the risks and benefits involved in the refusal of the proposed treatment and have answered the patient's questions.                        Date:  ______/______/_______  Provider                      Signature:  __________________________________________________________       Time:  ___________ A.M    P.M.

## (undated) NOTE — LETTER
AUTHORIZATION FOR SURGICAL OPERATION OR OTHER PROCEDURE    1. I hereby authorize Dr. Kash Cano, and [facility] staff assigned to my case to perform the following operation and/or procedure at the [facility] site:    _______________________________________________________________________________________________    Cortisone Injection Right Foot  _______________________________________________________________________________________________    2.  My physician has explained the nature and purpose of the operation or other procedure, possible alternative methods of treatment, the risks involved, and the possibility of complication to me.  I acknowledge that no guarantee has been made as to the result that may be obtained.  3.  I recognize that, during the course of this operation, or other procedure, unforseen conditions may necessitate additional or different procedure than those listed above.  I, therefore, further authorize and request that the above named physician, his/her physician assistants or designees perform such procedures as are, in his/her professional opinion, necessary and desirable.  4.  Any tissue or organs removed in the operation or other procedure may be disposed of by and at the discretion of the [facility] and [facility].  5.  I understand that in the event of a medical emergency, I will be transported by local paramedics to [facility] or other hospital emergency department.  6.  I certify that I have read and fully understand the above consent to operation and/or other procedure.    7.  I acknowledge that my physician has explained sedation/analgesia administration to me including the risks and benefits.  I consent to the administration of sedation/analgesia as may be necessary or desirable in the judgement of my physician.    Witness signature: ___________________________________________________ Date:   ______/______/_____                    Time:  ________ A.M.  P.M.       Patient Name:  ______________________________________________________  (please print)      Patient signature:  ___________________________________________________             Relationship to Patient:           []  Parent    Responsible person                          []  Spouse  In case of minor or                    [] Other  _____________   Incompetent name:  __________________________________________________                               (please print)      _____________      Responsible person  In case of minor or  Incompetent signature:  _______________________________________________    Statement of Physician  My signature below affirms that prior to the time of the procedure, I have explained to the patient and/or his/her guardian, the risks and benefits involved in the proposed treatment and any reasonable alternative to the proposed treatment.  I have also explained the risks and benefits involved in the refusal of the proposed treatment and have answered the patient's questions.                        Date:  ______/______/_______  Provider                      Signature:  __________________________________________________________       Time:  ___________ A.M    P.M.

## (undated) NOTE — LETTER
AUTHORIZATION FOR SURGICAL OPERATION OR OTHER PROCEDURE    1. I hereby authorize Dr. Kash Cano , and [facility] staff assigned to my case to perform the following operation and/or procedure at the [facility] site:    _______________________________________________________________________________________________    Cortisone injection right foot   _______________________________________________________________________________________________    2.  My physician has explained the nature and purpose of the operation or other procedure, possible alternative methods of treatment, the risks involved, and the possibility of complication to me.  I acknowledge that no guarantee has been made as to the result that may be obtained.  3.  I recognize that, during the course of this operation, or other procedure, unforseen conditions may necessitate additional or different procedure than those listed above.  I, therefore, further authorize and request that the above named physician, his/her physician assistants or designees perform such procedures as are, in his/her professional opinion, necessary and desirable.  4.  Any tissue or organs removed in the operation or other procedure may be disposed of by and at the discretion of the [facility] and [facility].  5.  I understand that in the event of a medical emergency, I will be transported by local paramedics to [facility] or other hospital emergency department.  6.  I certify that I have read and fully understand the above consent to operation and/or other procedure.    7.  I acknowledge that my physician has explained sedation/analgesia administration to me including the risks and benefits.  I consent to the administration of sedation/analgesia as may be necessary or desirable in the judgement of my physician.    Witness signature: ___________________________________________________ Date:   ______/______/_____                    Time:  ________ A.M.  P.M.       Patient Name:  ______________________________________________________  (please print)      Patient signature:  ___________________________________________________             Relationship to Patient:           []  Parent    Responsible person                          []  Spouse  In case of minor or                    [] Other  _____________   Incompetent name:  __________________________________________________                               (please print)      _____________      Responsible person  In case of minor or  Incompetent signature:  _______________________________________________    Statement of Physician  My signature below affirms that prior to the time of the procedure, I have explained to the patient and/or his/her guardian, the risks and benefits involved in the proposed treatment and any reasonable alternative to the proposed treatment.  I have also explained the risks and benefits involved in the refusal of the proposed treatment and have answered the patient's questions.                        Date:  ______/______/_______  Provider                      Signature:  __________________________________________________________       Time:  ___________ A.M    P.M.

## (undated) NOTE — LETTER
AUTHORIZATION FOR SURGICAL OPERATION OR OTHER PROCEDURE    1. I hereby authorize Dr. Kash harris , and [facility] staff assigned to my case to perform the following operation and/or procedure at the [facility] site:    _______________________________________________________________________________________________    Cortisone Injection right foot   _______________________________________________________________________________________________    2.  My physician has explained the nature and purpose of the operation or other procedure, possible alternative methods of treatment, the risks involved, and the possibility of complication to me.  I acknowledge that no guarantee has been made as to the result that may be obtained.  3.  I recognize that, during the course of this operation, or other procedure, unforseen conditions may necessitate additional or different procedure than those listed above.  I, therefore, further authorize and request that the above named physician, his/her physician assistants or designees perform such procedures as are, in his/her professional opinion, necessary and desirable.  4.  Any tissue or organs removed in the operation or other procedure may be disposed of by and at the discretion of the [facility] and [facility].  5.  I understand that in the event of a medical emergency, I will be transported by local paramedics to [facility] or other hospital emergency department.  6.  I certify that I have read and fully understand the above consent to operation and/or other procedure.    7.  I acknowledge that my physician has explained sedation/analgesia administration to me including the risks and benefits.  I consent to the administration of sedation/analgesia as may be necessary or desirable in the judgement of my physician.    Witness signature: ___________________________________________________ Date:   ______/______/_____                    Time:  ________ A.M.  P.M.       Patient Name:  ______________________________________________________  (please print)      Patient signature:  ___________________________________________________             Relationship to Patient:           []  Parent    Responsible person                          []  Spouse  In case of minor or                    [] Other  _____________   Incompetent name:  __________________________________________________                               (please print)      _____________      Responsible person  In case of minor or  Incompetent signature:  _______________________________________________    Statement of Physician  My signature below affirms that prior to the time of the procedure, I have explained to the patient and/or his/her guardian, the risks and benefits involved in the proposed treatment and any reasonable alternative to the proposed treatment.  I have also explained the risks and benefits involved in the refusal of the proposed treatment and have answered the patient's questions.                        Date:  ______/______/_______  Provider                      Signature:  __________________________________________________________       Time:  ___________ A.M    P.M.